# Patient Record
Sex: FEMALE | Race: ASIAN | NOT HISPANIC OR LATINO | Employment: FULL TIME | URBAN - METROPOLITAN AREA
[De-identification: names, ages, dates, MRNs, and addresses within clinical notes are randomized per-mention and may not be internally consistent; named-entity substitution may affect disease eponyms.]

---

## 2017-01-12 ENCOUNTER — TELEPHONE (OUTPATIENT)
Dept: FAMILY MEDICINE | Facility: CLINIC | Age: 52
End: 2017-01-12

## 2017-01-12 NOTE — TELEPHONE ENCOUNTER
Patient is due for a mammogram. Left message for patient to call back  [facility] [place] [facility] at [phone]. Encounter sent to reception team to send letter to home address.     If patient returns call, please help them schedule a mammogram.     Thank you,    [facility]

## 2017-01-12 NOTE — Clinical Note
January 13, 2017    Antonina Diana  [address]  [place] [address]    Dear Antonina Diana,     At [facility]  we care about your health and are committed to providing quality patient care, which includes staying current on preventative cancer screenings.  You can increase your chances of finding and treating cancers through regular screenings.      Our records show that you are due for the following screening:    Mammogram for breast cancer   Recommended every 1-2 years for women age 50 and older  Mammograms help detect breast cancer, which is the most common cancer among women in the [place].  You may need to start having mammograms earlier and more often if you have had breast cancer, breast problems, or a family history of breast cancer.     You may contact us by phone at [facility] at [phone] to schedule your mammogram at your earliest convenience.    If you have already had a mammogram at another facility, please call us so that we may update your chart.      Your partners in health,      Quality Committee   [facility]

## 2017-04-14 ENCOUNTER — TELEPHONE (OUTPATIENT)
Dept: FAMILY MEDICINE | Facility: CLINIC | Age: 52
End: 2017-04-14

## 2017-04-14 NOTE — LETTER
April 14, 2017      Antonina Diana  [address] [place] [address]            Dear Antonina,    In order to ensure we are providing the best quality care, we have reviewed your chart and see that you are due for:    -Physical with pap smear: Pap smear is a screening test used to detect cervical cancer. It is recommended every three years for women 21 and older. The test should be done at least once every three years but women who are at greater risk for cervical cancer may need to have the test more often.    -Mammogram: Recommended every 1-2 years for women age 50 and older  Mammograms help detect breast cancer, which is the most common cancer among women in the [place].  You may need to start having mammograms earlier and more often if you have had breast cancer, breast problems, or a family history of breast cancer.     -Colonoscopy/FIT test: Colonoscopy is recommended every ten years for everyone age 50 and older. Please take a moment to read over the enclosed information packet about colon cancer screening. We strongly urge our patient's to consider having a colonoscopy done, which is the best screening test available and only needs to be done every 10 years if normal. If you are unwilling or unable to have a colonoscopy then we recommend the annual stool testing for blood. This test is called a FIT test and it looks for blood in the stool.     Please call the clinic at your earliest convenience to schedule an appointment. If you have had any of the screenings listed above at another facility, please call us so that we may update your chart.      Thank you for trusting us with your health care.    Sincerely,    [facility]/ [phone]  [phone]

## 2017-04-14 NOTE — TELEPHONE ENCOUNTER
Panel Management Review    Patient is due/failing the following:   COLONOSCOPY, MAMMOGRAM, PAP and PHYSICAL    Action needed:   Patient needs office visit for Physical.    Type of outreach:    attempted to call with no anser and VM is full. letter was mailed to pt.    Questions for provider review:    None                                                                                                                                    GALE Silva

## 2017-04-27 NOTE — TELEPHONE ENCOUNTER
Patient returning call. Patient was informed that a letter was mailed out to her due to vm being full. Patient states she did not receive anything and would like a call back. Call patient to inform/advise. Thanks.    Patient called on 4/27/17    Edgardo Stroud, Patient Rep

## 2017-08-17 DIAGNOSIS — R21 SKIN RASH: ICD-10-CM

## 2017-08-17 NOTE — TELEPHONE ENCOUNTER
Reason for Call:  Medication or medication refill:    Do you use a [facility]?  Name of the pharmacy and phone number for the current request:  [facility] - [place] - [phone]    Name of the medication requested: hydrocortisone valerate (WEST-DONNA) 0.2 % ointment    Other request: Please call when sent to pharmacy     Can we leave a detailed message on this number? YES    Phone number patient can be reached at: Home number on file [phone] (home)    Best Time: Any    Call taken on 8/17/2017 at 6:57 PM by Kacie Faria

## 2017-08-18 NOTE — TELEPHONE ENCOUNTER
hydrocortisone valerate (WEST-DONNA) 0.2 % ointment      Last Written Prescription Date: 7/12/16  Last Fill Quantity: 45,  # refills: 1   Last Office Visit with [facility], [facility] or [facility] prescribing provider: 7/8/16      Calli Moya  [facility]

## 2017-08-22 RX ORDER — HYDROCORTISONE VALERATE 2 MG/G
OINTMENT TOPICAL
Qty: 45 G | Refills: 1 | Status: SHIPPED | OUTPATIENT
Start: 2017-08-22 | End: 2021-10-13

## 2017-10-29 ENCOUNTER — HEALTH MAINTENANCE LETTER (OUTPATIENT)
Age: 52
End: 2017-10-29

## 2017-12-16 ENCOUNTER — TELEPHONE (OUTPATIENT)
Dept: FAMILY MEDICINE | Facility: CLINIC | Age: 52
End: 2017-12-16

## 2017-12-16 NOTE — LETTER
Antonina Diana  [address]  [place] [place] [place] [address]          12/18/17      Dear Antonina Diana        At [facility] we care about your health and are committed to providing quality patient care. Regular appointments are a vital part of the care and management of your health and can help prevent many of the complications that can occur.      It has come to our attention that you are due for an office visit in order to process your medication request. Please call [facility] at [phone] soon to schedule your appointment.    If you have transferred care to another clinic please call to inform us so that we do not continue to send you reminder letters.      Sincerely,      [facility] Care Team

## 2017-12-18 NOTE — TELEPHONE ENCOUNTER
This writer attempted to contact Patient on 12/18/17      Reason for call Patient needs an office visit for medication request and unable to leave message.  Sent a letter.  If patient calls back:   Schedule Office Visit appointment within 1 week with primary care .        Nicolasa Scott MA

## 2017-12-18 NOTE — TELEPHONE ENCOUNTER
Patient has not been seen in over a year, also medication is not on medication list. Will need to be seen.    Mary Beth West RN, [facility]

## 2018-01-12 ENCOUNTER — OFFICE VISIT (OUTPATIENT)
Dept: FAMILY MEDICINE | Facility: CLINIC | Age: 53
End: 2018-01-12
Payer: COMMERCIAL

## 2018-01-12 VITALS
DIASTOLIC BLOOD PRESSURE: 85 MMHG | HEART RATE: 103 BPM | TEMPERATURE: 100.7 F | WEIGHT: 190 LBS | OXYGEN SATURATION: 95 % | RESPIRATION RATE: 16 BRPM | HEIGHT: 62 IN | SYSTOLIC BLOOD PRESSURE: 139 MMHG | BODY MASS INDEX: 34.96 KG/M2

## 2018-01-12 DIAGNOSIS — R68.89 FLU-LIKE SYMPTOMS: ICD-10-CM

## 2018-01-12 DIAGNOSIS — R11.10 VOMITING AND DIARRHEA: ICD-10-CM

## 2018-01-12 DIAGNOSIS — J20.9 ACUTE BRONCHITIS WITH SYMPTOMS > 10 DAYS: Primary | ICD-10-CM

## 2018-01-12 DIAGNOSIS — Z71.84 TRAVEL ADVICE ENCOUNTER: ICD-10-CM

## 2018-01-12 DIAGNOSIS — R19.7 VOMITING AND DIARRHEA: ICD-10-CM

## 2018-01-12 PROCEDURE — 99214 OFFICE O/P EST MOD 30 MIN: CPT | Performed by: FAMILY MEDICINE

## 2018-01-12 RX ORDER — CODEINE PHOSPHATE AND GUAIFENESIN 10; 100 MG/5ML; MG/5ML
2 SOLUTION ORAL EVERY 4 HOURS PRN
Qty: 236 ML | Refills: 1 | Status: SHIPPED | OUTPATIENT
Start: 2018-01-12 | End: 2018-01-20

## 2018-01-12 RX ORDER — ONDANSETRON 4 MG/1
4-8 TABLET, ORALLY DISINTEGRATING ORAL EVERY 8 HOURS PRN
Qty: 12 TABLET | Refills: 3 | Status: SHIPPED | OUTPATIENT
Start: 2018-01-12 | End: 2021-10-13

## 2018-01-12 RX ORDER — DOXYCYCLINE 100 MG/1
100 CAPSULE ORAL 2 TIMES DAILY
Qty: 20 CAPSULE | Refills: 0 | Status: SHIPPED | OUTPATIENT
Start: 2018-01-12 | End: 2018-01-22

## 2018-01-12 ASSESSMENT — PAIN SCALES - GENERAL: PAINLEVEL: NO PAIN (0)

## 2018-01-12 NOTE — PROGRESS NOTES
"  SUBJECTIVE:   Antonina Diana is a 52 year old female who presents to clinic today for the following health issues:    Acute Illness   Acute illness concerns: Cough  Onset: 1 week    Fever: YES    Chills/Sweats: YES    Headache (location?): YES    Sinus Pressure:YES    Conjunctivitis:  no    Ear Pain: YES: both    Rhinorrhea: No    Congestion: Yes     Sore Throat: YES     Cough: YES-productive of green and red sputum    Wheeze: YES    Decreased Appetite: no     Nausea: no     Vomiting: no     Diarrhea:  no     Dysuria/Freq.: no     Fatigue/Achiness: no     Sick/Strep Exposure: YES     Therapies Tried and outcome: OTC      Problem list and histories reviewed & adjusted, as indicated.  Additional history: as documented    Patient Active Problem List   Diagnosis     Hyperlipidemia LDL goal <160     Positional vertigo     Hyperglycemia     Past Surgical History:   Procedure Laterality Date     APPENDECTOMY  age 21       Social History   Substance Use Topics     Smoking status: Never Smoker     Smokeless tobacco: Never Used     Alcohol use No     Family History   Problem Relation Age of Onset     CANCER No family hx of      DIABETES No family hx of      Hypertension No family hx of      CEREBROVASCULAR DISEASE No family hx of      Thyroid Disease No family hx of      Glaucoma No family hx of      Macular Degeneration No family hx of              Reviewed and updated as needed this visit by clinical staff     Reviewed and updated as needed this visit by Provider         ROS:  Constitutional, HEENT, cardiovascular, pulmonary, GI, , musculoskeletal, neuro, skin, endocrine and psych systems are negative, except as otherwise noted.      OBJECTIVE:   /85 (BP Location: Left arm, Patient Position: Chair, Cuff Size: Adult Large)  Pulse 103  Temp 100.7  F (38.2  C) (Oral)  Resp 16  Ht 5' 2\" (1.575 m)  Wt 190 lb (86.2 kg)  SpO2 95%  BMI 34.75 kg/m2  Body mass index is 34.75 kg/(m^2).  GENERAL: healthy, alert and no " distress  NECK: no adenopathy, no asymmetry, masses, or scars and thyroid normal to palpation  RESP: lungs clear to auscultation - no rales, rhonchi or wheezes  CV: regular rate and rhythm, normal S1 S2, no S3 or S4, no murmur, click or rub, no peripheral edema and peripheral pulses strong  ABDOMEN: soft, nontender, no hepatosplenomegaly, no masses and bowel sounds normal  MS: no gross musculoskeletal defects noted, no edema    Diagnostic Test Results:  none     ASSESSMENT/PLAN:     1. Acute bronchitis with symptoms > 10 days  Symptoms worsening, continues to be febrile after 7 days, abx indicated. Doxycycline given for 10 days. RTC if no improvements.  - doxycycline monohydrate 100 MG capsule; Take 1 capsule (100 mg) by mouth 2 times daily for 10 days  Dispense: 20 capsule; Refill: 0  - guaiFENesin-codeine (ROBITUSSIN AC) 100-10 MG/5ML SOLN solution; Take 10 mLs by mouth every 4 hours as needed  Dispense: 236 mL; Refill: 1    2. Flu-like symptoms  Too late for Tamiflu. Discussed symptomatic cares.    3. Travel advice encounter  Planning to go to [place] in 1 month.  - TRAVEL CLINIC REFERRAL    See Patient Instructions    Dez Raya MD, MD  [facility]

## 2018-01-12 NOTE — MR AVS SNAPSHOT
"              After Visit Summary   1/12/2018    Antonina Diana    MRN: [id number]           Patient Information     Date Of Birth          1965        Visit Information        Provider Department      1/12/2018 8:20 AM Dez Raya MD [facility]        Today's Diagnoses     Acute bronchitis with symptoms > 10 days    -  1    Travel advice encounter           Follow-ups after your visit        Additional Services     TRAVEL CLINIC REFERRAL       Your provider has referred you to the [facility] - Please call [phone] to schedule an appointment.   Fax number: [phone]    Please be aware that coverage of these services is subject to the terms and limitations of your health insurance plans.  Call member services at your health plan with any benefit coverage questions.                  Who to contact     If you have questions or need follow up information about today's clinic visit or your schedule please contact [facility] directly at [phone].  Normal or non-critical lab and imaging results will be communicated to you by MyChart, letter or phone within 4 business days after the clinic has received the results. If you do not hear from us within 7 days, please contact the clinic through [employer] or phone. If you have a critical or abnormal lab result, we will notify you by phone as soon as possible.  Submit refill requests through [employer] or call your pharmacy and they will forward the refill request to us. Please allow 3 business days for your refill to be completed.          Additional Information About Your Visit        [employer] Information     [employer] lets you send messages to your doctor, view your test results, renew your prescriptions, schedule appointments and more. To sign up, go to [email or web address] . Click on \"Log in\" on the left side of the screen, which will take you to the Welcome page. Then click on \"Sign up Now\" on the right " "side of the page.     You will be asked to enter the access code listed below, as well as some personal information. Please follow the directions to create your username and password.     Your access code is: [email or web address]  Expires: 3/27/2018  3:20 PM     Your access code will  in 90 days. If you need help or a new code, please call your [facility] clinic or [phone].        Care EveryWhere ID     This is your Care EveryWhere ID. This could be used by other organizations to access your [facility] medical records  [id number]        Your Vitals Were     Pulse Temperature Respirations Height Pulse Oximetry BMI (Body Mass Index)    103 100.7  F (38.2  C) (Oral) 16 5' 2\" (1.575 m) 95% 34.75 kg/m2       Blood Pressure from Last 3 Encounters:   18 139/85   16 138/88   16 139/83    Weight from Last 3 Encounters:   18 190 lb (86.2 kg)   16 183 lb 9.6 oz (83.3 kg)   16 178 lb 6.4 oz (80.9 kg)              We Performed the Following     TRAVEL CLINIC REFERRAL          Today's Medication Changes          These changes are accurate as of: 18  8:33 AM.  If you have any questions, ask your nurse or doctor.               Start taking these medicines.        Dose/Directions    doxycycline monohydrate 100 MG capsule   Used for:  Acute bronchitis with symptoms > 10 days   Started by:  Dez Raya MD        Dose:  100 mg   Take 1 capsule (100 mg) by mouth 2 times daily for 10 days   Quantity:  20 capsule   Refills:  0       guaiFENesin-codeine 100-10 MG/5ML Soln solution   Commonly known as:  ROBITUSSIN AC   Used for:  Acute bronchitis with symptoms > 10 days   Started by:  Dez Raya MD        Dose:  2 tsp.   Take 10 mLs by mouth every 4 hours as needed   Quantity:  236 mL   Refills:  1            Where to get your medicines      These medications were sent to [facility] [place] - [address]  [address], [place] [address]     Phone:  " [phone]     doxycycline monohydrate 100 MG capsule         Some of these will need a paper prescription and others can be bought over the counter.  Ask your nurse if you have questions.     Bring a paper prescription for each of these medications     guaiFENesin-codeine 100-10 MG/5ML Soln solution                Primary Care Provider Office Phone # Fax #    Shirin Rei Diana [phone] [address] [place] [facility] [address]        Equal Access to Services     [facility] : Hadii aad ku hadasho Soomaali, waaxda luqadaha, qaybta kaalmada adeegyada, waxay idiin hayaan adeeg kharavita lajonelle hogan. So [facility] [phone].    ATENCIÓN: Si habla español, tiene a corona disposición servicios gratuitos de asistencia lingüística. [facility] [phone].    We comply with applicable federal civil rights laws and [place] laws. We do not discriminate on the basis of race, color, national origin, age, disability, sex, sexual orientation, or gender identity.            Thank you!     Thank you for choosing [facility]  for your care. Our goal is always to provide you with excellent care. Hearing back from our patients is one way we can continue to improve our services. Please take a few minutes to complete the written survey that you may receive in the mail after your visit with us. Thank you!             Your Updated Medication List - Protect others around you: Learn how to safely use, store and throw away your medicines at www.disposemymeds.org.          This list is accurate as of: 1/12/18  8:33 AM.  Always use your most recent med list.                   Brand Name Dispense Instructions for use Diagnosis    acetaminophen 325 MG tablet    TYLENOL    100 tablet    Take 2 tablets (650 mg) by mouth every 6 hours as needed for mild pain    Headache(784.0)       doxycycline monohydrate 100 MG capsule     20 capsule    Take 1 capsule (100 mg) by mouth 2 times daily for 10 days    Acute bronchitis  with symptoms > 10 days       guaiFENesin-codeine 100-10 MG/5ML Soln solution    ROBITUSSIN AC    236 mL    Take 10 mLs by mouth every 4 hours as needed    Acute bronchitis with symptoms > 10 days       hydrocortisone valerate 0.2 % ointment    WEST-DONNA    45 g    Apply sparingly to affected area three times daily for 14 days.    Skin rash       ondansetron 4 MG ODT tab    ZOFRAN ODT    12 tablet    Take 1-2 tablets (4-8 mg) by mouth every 8 hours as needed for nausea or vomiting    Vomiting and diarrhea

## 2018-01-12 NOTE — TELEPHONE ENCOUNTER
Routing refill request to provider for review/approval because:  A break in medication last filled 7/8/16.    Mary Beth West RN, [facility]

## 2018-01-12 NOTE — TELEPHONE ENCOUNTER
.Reason for Call:  Medication or medication refill:    Do you use a [facility]?  Name of the pharmacy and phone number for the current request:  [facility] - [place] - [phone]    Name of the medication requested: ondansetron (ZOFRAN ODT) 4 MG disintegrating tablet    Other request: Patient stated that she would like to get the Zofran refill and would like to be notified when it is sent to the pharmacy.    Can we leave a detailed message on this number? YES    Phone number patient can be reached at: Home number on file [phone] (home)    Best Time: any    Call taken on 1/12/2018 at 9:00 AM by Gallo Humphries

## 2018-01-20 DIAGNOSIS — J20.9 ACUTE BRONCHITIS WITH SYMPTOMS > 10 DAYS: ICD-10-CM

## 2018-01-20 NOTE — TELEPHONE ENCOUNTER
guaiFENesin-codeine (ROBITUSSIN AC) 100-10 MG/5ML SOLN solution filled 1/12/18.          Lalito Faarax  [facility]

## 2018-01-23 RX ORDER — CODEINE PHOSPHATE AND GUAIFENESIN 10; 100 MG/5ML; MG/5ML
2 SOLUTION ORAL EVERY 4 HOURS PRN
Qty: 236 ML | Refills: 1 | Status: SHIPPED | OUTPATIENT
Start: 2018-01-23 | End: 2021-10-13

## 2018-01-23 NOTE — TELEPHONE ENCOUNTER
Routing refill request to provider for review/approval because:  Drug not on the [facility] refill protocol     Yanni Roque RN   [facility]

## 2021-10-10 ENCOUNTER — NURSE TRIAGE (OUTPATIENT)
Dept: NURSING | Facility: CLINIC | Age: 56
End: 2021-10-10

## 2021-10-11 NOTE — TELEPHONE ENCOUNTER
Patient calling reporting having frequent urination. Denies having decreased urine out put. Denies fever. Taking good intake. Advised patient to be seen within 24 hours. Caller verbalized understanding. Denies further questions.      Lucas Lawrence RN  [facility] Nurse Advisors     COVID 19 Nurse Triage Plan/Patient Instructions    Please be aware that novel coronavirus (COVID-19) may be circulating in the community. If you develop symptoms such as fever, cough, or SOB or if you have concerns about the presence of another infection including coronavirus (COVID-19), please contact your health care provider or visit [email or web address].     Disposition/Instructions    In-Person Visit with provider recommended. Reference Visit Selection Guide.    Thank you for taking steps to prevent the spread of this virus.  o Limit your contact with others.  o Wear a simple mask to cover your cough.  o Wash your hands well and often.    Resources    [facility]: About COVID-19: [email or web address]    CDC: What to Do If You're Sick: www.cdc.gov/coronavirus/2019-ncov/about/steps-when-sick.html    CDC: Ending Home Isolation: www.cdc.gov/coronavirus/2019-ncov/hcp/disposition-in-home-patients.html     CDC: Caring for Someone: www.cdc.gov/coronavirus/2019-ncov/if-you-are-sick/care-for-someone.html     [facility]: Interim Guidance for Hospital Discharge to Home: www.health.[facility].[place]/diseases/coronavirus/hcp/hospdischarge.pdf    [facility] clinical trials (COVID-19 research studies): [email or web address].[facility]-clinical-trials     Below are the COVID-19 hotlines at the [place] Department of Health ([facility]). Interpreters are available.   o For health questions: Call 411-452-8458 or 1-259.440.4263 (7 a.m. to 7 p.m.)  o For questions about schools and childcare: Call 090-534-1602 or 1-990.513.4948 (7 a.m. to 7 p.m.)                       Reason for Disposition    [1] Can't control passage of urine (i.e.,  urinary incontinence) AND [2] new onset (< 2 weeks) or worsening    Additional Information    Negative: Shock suspected (e.g., cold/pale/clammy skin, too weak to stand, low BP, rapid pulse)    Negative: Sounds like a life-threatening emergency to the triager    Negative: [1] Unable to urinate (or only a few drops) > 4 hours AND     [2] bladder feels very full (e.g., palpable bladder or strong urge to urinate)    Negative: [1] Decreased urination and [2] drinking very little AND [2] dehydration suspected (e.g., dark urine, no urine > 12 hours, very dry mouth, very lightheaded)    Negative: Patient sounds very sick or weak to the triager    Negative: Fever > 100.4 F (38.0 C)    Negative: Side (flank) or lower back pain present    Protocols used: URINARY SYMPTOMS-A-AH

## 2021-10-12 NOTE — PATIENT INSTRUCTIONS
Preventive Health Recommendations  Female Ages 50 - 64    Yearly exam: See your health care provider every year in order to  o Review health changes.   o Discuss preventive care.    o Review your medicines if your doctor has prescribed any.      Get a Pap test every three years (unless you have an abnormal result and your provider advises testing more often).    If you get Pap tests with HPV test, you only need to test every 5 years, unless you have an abnormal result.     You do not need a Pap test if your uterus was removed (hysterectomy) and you have not had cancer.    You should be tested each year for STDs (sexually transmitted diseases) if you're at risk.     Have a mammogram every 1 to 2 years.    Have a colonoscopy at age 50, or have a yearly FIT test (stool test). These exams screen for colon cancer.      Have a cholesterol test every 5 years, or more often if advised.    Have a diabetes test (fasting glucose) every three years. If you are at risk for diabetes, you should have this test more often.     If you are at risk for osteoporosis (brittle bone disease), think about having a bone density scan (DEXA).    Shots: Get a flu shot each year. Get a tetanus shot every 10 years.    Nutrition:     Eat at least 5 servings of fruits and vegetables each day.    Eat whole-grain bread, whole-wheat pasta and brown rice instead of white grains and rice.    Get adequate Calcium and Vitamin D.     Lifestyle    Exercise at least 150 minutes a week (30 minutes a day, 5 days a week). This will help you control your weight and prevent disease.    Limit alcohol to one drink per day.    No smoking.     Wear sunscreen to prevent skin cancer.     See your dentist every six months for an exam and cleaning.    See your eye doctor every 1 to 2 years.  At [facility], we strive to deliver an exceptional experience to you, every time we see you. If you receive a survey, please complete it as we do  value your feedback.  If you have MyChart, you can expect to receive results automatically within 24 hours of their completion.  Your provider will send a note interpreting your results as well.   If you do not have MyChart, you should receive your results in about a week by mail.    Your care team:                            Family Medicine Internal Medicine   MD oTm Gordillo MD Shantel Branch-Fleming, MD Mamadou Dangelo, MD Sanna Calix, PAANNETTE Pereira, APRAURA Raya, MD Pediatrics   Cm Sommer, MILLICENT Jauregui, MD Rebeca Kilgore APRN CNP   MD Sury Lieberman MD Deborah Mielke, MD Kim Thein, APR[facility]      Clinic hours: Monday - Thursday 7 am-6 pm; Fridays 7 am-5 pm.   Urgent care: Monday - Friday 10 am- 8 pm; Saturday and Sunday 9 am-5 pm.    Clinic: [phone]       [facility]: Monday - Thursday 8 am - 7 pm; Friday 8 am - 6 pm  [facility]: [phone]     Use www.[email or web address].org for 24/7 diagnosis and treatment of dozens of conditions.

## 2021-10-12 NOTE — PROGRESS NOTES
Answers for HPI/ROS submitted by the patient on 10/13/2021  If you checked off any problems, how difficult have these problems made it for you to do your work, take care of things at home, or get along with other people?: Not difficult at all  PHQ9 TOTAL SCORE: 3         SUBJECTIVE:   CC: Antonina Diana is an 56 year old woman who presents for preventive health visit.       Patient has been advised of split billing requirements and indicates understanding: Yes  Healthy Habits:     Getting at least 3 servings of Calcium per day:  NO    Bi-annual eye exam:  Yes    Dental care twice a year:  Yes    Sleep apnea or symptoms of sleep apnea:  None    Diet:  Regular (no restrictions)    Frequency of exercise:  1 day/week    Duration of exercise:  Other    PHQ-2 Total Score: 3    Additional concerns today:  No      Today's PHQ-2 Score:   PHQ-2 ( 1999 Pfizer) 1/12/2018   Q1: Little interest or pleasure in doing things 0   Q2: Feeling down, depressed or hopeless 0   PHQ-2 Score 0       Abuse: Current or Past (Physical, Sexual or Emotional) - No  Do you feel safe in your environment? Yes    Have you ever done Advance Care Planning? (For example, a Health Directive, POLST, or a discussion with a medical provider or your loved ones about your wishes): No, advance care planning information given to patient to review.  Patient plans to discuss their wishes with loved ones or provider.      Social History     Tobacco Use     Smoking status: Never Smoker     Smokeless tobacco: Never Used   Substance Use Topics     Alcohol use: No     If you drink alcohol do you typically have >3 drinks per day or >7 drinks per week? No    Alcohol Use 3/12/2013   Prescreen: >3 drinks/day or >7 drinks/week? The patient does not drink >3 drinks per day nor >7 drinks per week.   No flowsheet data found.      History of abnormal Pap smear: NO - age 30-65 PAP every 5 years with negative HPV co-testing recommended     Reviewed and updated as needed this visit  "by clinical staff                 Reviewed and updated as needed this visit by Provider                    Review of Systems  CONSTITUTIONAL: NEGATIVE for fever, chills, change in weight  INTEGUMENTARY/SKIN: NEGATIVE for worrisome rashes, moles or lesions  EYES: NEGATIVE for vision changes or irritation  ENT: NEGATIVE for ear, mouth and throat problems  RESP: NEGATIVE for significant cough or SOB  BREAST: NEGATIVE for masses, tenderness or discharge  CV: NEGATIVE for chest pain, palpitations or peripheral edema  GI: NEGATIVE for nausea, abdominal pain, heartburn, or change in bowel habits  : NEGATIVE for unusual urinary or vaginal symptoms. No vaginal bleeding.  MUSCULOSKELETAL: NEGATIVE for significant arthralgias or myalgia  NEURO: NEGATIVE for weakness, dizziness or paresthesias  PSYCHIATRIC: NEGATIVE for changes in mood or affect      OBJECTIVE:   /87 (BP Location: Left arm, Patient Position: Sitting, Cuff Size: Adult Regular)   Pulse 74   Temp 97.5  F (36.4  C) (Tympanic)   Resp 16   Ht 1.57 m (5' 1.81\")   Wt 67.6 kg (149 lb)   SpO2 96%   BMI 27.42 kg/m    Physical Exam  GENERAL: healthy, alert and no distress  NECK: no adenopathy, no asymmetry, masses, or scars and thyroid normal to palpation  RESP: lungs clear to auscultation - no rales, rhonchi or wheezes  CV: regular rate and rhythm, normal S1 S2, no S3 or S4, no murmur, click or rub, no peripheral edema and peripheral pulses strong  ABDOMEN: soft, nontender, no hepatosplenomegaly, no masses and bowel sounds normal  MS: no gross musculoskeletal defects noted, no edema    Diagnostic Test Results:  Labs reviewed in Epic    ASSESSMENT/PLAN:   (Z00.00) Routine general medical examination at a health care facility  (primary encounter diagnosis)  Comment:   Plan: as below.    (E78.5) Hyperlipidemia LDL goal <160  Comment:   Plan: Lipid panel reflex to direct LDL Fasting        Recheck.    (Z13.1) Screening for diabetes mellitus  Comment:   Plan: " "Hemoglobin A1c            (Z12.11) Screen for colon cancer  Comment:   Plan: Fecal colorectal cancer screen (FIT)            (Z12.31) Encounter for screening mammogram for breast cancer  Comment:   Plan: MA SCREENING DIGITAL BILAT - Future  (s+30)            (Z12.4) Screening for malignant neoplasm of cervix  Comment:   Plan: Pap Screen with HPV - recommended age 30 - 65         years, Ob/Gyn Referral. Unable to do Pap today. Patient could not relax and painful. Referred to Gynecology to try to attempt.            (Z11.4) Screening for HIV (human immunodeficiency virus)  Comment:   Plan: HIV Antigen Antibody Combo            (Z11.59) Need for hepatitis C screening test  Comment:   Plan: Hepatitis C Screen Reflex to HCV RNA Quant and         Genotype            (R35.0) Urinary frequency  Comment:   Plan: UA with Microscopic reflex to Culture - lab         collect        R/o UTI, diabetes    (Z23) Encounter for immunization  Comment:   Plan: INFLUENZA QUAD, RECOMBINANT, P-FREE (RIV4)         (FLUBLOK)              Patient has been advised of split billing requirements and indicates understanding: Yes  COUNSELING:  Reviewed preventive health counseling, as reflected in patient instructions       Regular exercise       Healthy diet/nutrition       Vision screening    Estimated body mass index is 34.75 kg/m  as calculated from the following:    Height as of 1/12/18: 1.575 m (5' 2\").    Weight as of 1/12/18: 86.2 kg (190 lb).        She reports that she has never smoked. She has never used smokeless tobacco.      Counseling Resources:  ATP IV Guidelines  Pooled Cohorts Equation Calculator  Breast Cancer Risk Calculator  BRCA-Related Cancer Risk Assessment: FHS-7 Tool  FRAX Risk Assessment  ICSI Preventive Guidelines  Dietary Guidelines for Americans, 2010  USDA's MyPlate  ASA Prophylaxis  Lung CA Screening    Dez Raya MD, MD  [facility]  "

## 2021-10-13 ENCOUNTER — TELEPHONE (OUTPATIENT)
Dept: FAMILY MEDICINE | Facility: CLINIC | Age: 56
End: 2021-10-13

## 2021-10-13 ENCOUNTER — OFFICE VISIT (OUTPATIENT)
Dept: FAMILY MEDICINE | Facility: CLINIC | Age: 56
End: 2021-10-13
Payer: COMMERCIAL

## 2021-10-13 ENCOUNTER — NURSE TRIAGE (OUTPATIENT)
Dept: NURSING | Facility: CLINIC | Age: 56
End: 2021-10-13

## 2021-10-13 VITALS
DIASTOLIC BLOOD PRESSURE: 87 MMHG | SYSTOLIC BLOOD PRESSURE: 135 MMHG | BODY MASS INDEX: 27.42 KG/M2 | RESPIRATION RATE: 16 BRPM | OXYGEN SATURATION: 96 % | TEMPERATURE: 97.5 F | WEIGHT: 149 LBS | HEIGHT: 62 IN | HEART RATE: 74 BPM

## 2021-10-13 DIAGNOSIS — Z13.1 SCREENING FOR DIABETES MELLITUS: ICD-10-CM

## 2021-10-13 DIAGNOSIS — Z11.59 NEED FOR HEPATITIS C SCREENING TEST: ICD-10-CM

## 2021-10-13 DIAGNOSIS — Z00.00 ROUTINE GENERAL MEDICAL EXAMINATION AT A HEALTH CARE FACILITY: Primary | ICD-10-CM

## 2021-10-13 DIAGNOSIS — Z23 ENCOUNTER FOR IMMUNIZATION: ICD-10-CM

## 2021-10-13 DIAGNOSIS — Z12.31 ENCOUNTER FOR SCREENING MAMMOGRAM FOR BREAST CANCER: ICD-10-CM

## 2021-10-13 DIAGNOSIS — Z12.11 SCREEN FOR COLON CANCER: ICD-10-CM

## 2021-10-13 DIAGNOSIS — E78.5 HYPERLIPIDEMIA LDL GOAL <160: ICD-10-CM

## 2021-10-13 DIAGNOSIS — Z12.4 SCREENING FOR MALIGNANT NEOPLASM OF CERVIX: ICD-10-CM

## 2021-10-13 DIAGNOSIS — Z11.4 SCREENING FOR HIV (HUMAN IMMUNODEFICIENCY VIRUS): ICD-10-CM

## 2021-10-13 DIAGNOSIS — R35.0 URINARY FREQUENCY: ICD-10-CM

## 2021-10-13 LAB
ALBUMIN SERPL-MCNC: 3.7 G/DL (ref 3.4–5)
ALBUMIN UR-MCNC: NEGATIVE MG/DL
ALP SERPL-CCNC: 76 U/L (ref 40–150)
ALT SERPL W P-5'-P-CCNC: 24 U/L (ref 0–50)
ANION GAP SERPL CALCULATED.3IONS-SCNC: 7 MMOL/L (ref 3–14)
APPEARANCE UR: CLEAR
AST SERPL W P-5'-P-CCNC: 13 U/L (ref 0–45)
BILIRUB SERPL-MCNC: 0.3 MG/DL (ref 0.2–1.3)
BILIRUB UR QL STRIP: NEGATIVE
BUN SERPL-MCNC: 12 MG/DL (ref 7–30)
CALCIUM SERPL-MCNC: 9.5 MG/DL (ref 8.5–10.1)
CHLORIDE BLD-SCNC: 99 MMOL/L (ref 94–109)
CHOLEST SERPL-MCNC: 224 MG/DL
CO2 SERPL-SCNC: 28 MMOL/L (ref 20–32)
COLOR UR AUTO: YELLOW
CREAT SERPL-MCNC: 0.49 MG/DL (ref 0.52–1.04)
FASTING STATUS PATIENT QL REPORTED: YES
GFR SERPL CREATININE-BSD FRML MDRD: >90 ML/MIN/1.73M2
GLUCOSE BLD-MCNC: 416 MG/DL (ref 70–99)
GLUCOSE UR STRIP-MCNC: >=1000 MG/DL
HBA1C MFR BLD: >15 % (ref 0–5.6)
HCV AB SERPL QL IA: NONREACTIVE
HDLC SERPL-MCNC: 51 MG/DL
HEMOCCULT STL QL IA: NEGATIVE
HGB UR QL STRIP: ABNORMAL
HIV 1+2 AB+HIV1 P24 AG SERPL QL IA: NONREACTIVE
KETONES UR STRIP-MCNC: NEGATIVE MG/DL
LDLC SERPL CALC-MCNC: 148 MG/DL
LEUKOCYTE ESTERASE UR QL STRIP: NEGATIVE
NITRATE UR QL: NEGATIVE
NONHDLC SERPL-MCNC: 173 MG/DL
PH UR STRIP: 6 [PH] (ref 5–7)
POTASSIUM BLD-SCNC: 4.3 MMOL/L (ref 3.4–5.3)
PROT SERPL-MCNC: 7.6 G/DL (ref 6.8–8.8)
RBC #/AREA URNS AUTO: ABNORMAL /HPF
SODIUM SERPL-SCNC: 134 MMOL/L (ref 133–144)
SP GR UR STRIP: 1.02 (ref 1–1.03)
SQUAMOUS #/AREA URNS AUTO: ABNORMAL /LPF
TRIGL SERPL-MCNC: 127 MG/DL
UROBILINOGEN UR STRIP-ACNC: 0.2 E.U./DL
WBC #/AREA URNS AUTO: ABNORMAL /HPF

## 2021-10-13 PROCEDURE — 86803 HEPATITIS C AB TEST: CPT | Performed by: FAMILY MEDICINE

## 2021-10-13 PROCEDURE — 82274 ASSAY TEST FOR BLOOD FECAL: CPT | Performed by: FAMILY MEDICINE

## 2021-10-13 PROCEDURE — 99396 PREV VISIT EST AGE 40-64: CPT | Mod: 25 | Performed by: FAMILY MEDICINE

## 2021-10-13 PROCEDURE — 81001 URINALYSIS AUTO W/SCOPE: CPT | Performed by: FAMILY MEDICINE

## 2021-10-13 PROCEDURE — 90471 IMMUNIZATION ADMIN: CPT | Performed by: FAMILY MEDICINE

## 2021-10-13 PROCEDURE — 80053 COMPREHEN METABOLIC PANEL: CPT | Performed by: FAMILY MEDICINE

## 2021-10-13 PROCEDURE — 83036 HEMOGLOBIN GLYCOSYLATED A1C: CPT | Performed by: FAMILY MEDICINE

## 2021-10-13 PROCEDURE — 80061 LIPID PANEL: CPT | Performed by: FAMILY MEDICINE

## 2021-10-13 PROCEDURE — 87389 HIV-1 AG W/HIV-1&-2 AB AG IA: CPT | Performed by: FAMILY MEDICINE

## 2021-10-13 PROCEDURE — 36415 COLL VENOUS BLD VENIPUNCTURE: CPT | Performed by: FAMILY MEDICINE

## 2021-10-13 PROCEDURE — 90682 RIV4 VACC RECOMBINANT DNA IM: CPT | Performed by: FAMILY MEDICINE

## 2021-10-13 ASSESSMENT — ENCOUNTER SYMPTOMS
SHORTNESS OF BREATH: 0
FEVER: 0
ABDOMINAL PAIN: 0
EYE PAIN: 0
CHILLS: 0
HEARTBURN: 0
HEMATURIA: 0
DYSURIA: 0
FREQUENCY: 0
SORE THROAT: 0
NERVOUS/ANXIOUS: 0
HEMATOCHEZIA: 0
HEADACHES: 0
NAUSEA: 0
DIARRHEA: 0
ARTHRALGIAS: 0
PARESTHESIAS: 0
CONSTIPATION: 0
COUGH: 0
WEAKNESS: 0
JOINT SWELLING: 0
DIZZINESS: 0
MYALGIAS: 0
PALPITATIONS: 0

## 2021-10-13 ASSESSMENT — MIFFLIN-ST. JEOR: SCORE: 1221.11

## 2021-10-13 ASSESSMENT — PATIENT HEALTH QUESTIONNAIRE - PHQ9
SUM OF ALL RESPONSES TO PHQ QUESTIONS 1-9: 3
10. IF YOU CHECKED OFF ANY PROBLEMS, HOW DIFFICULT HAVE THESE PROBLEMS MADE IT FOR YOU TO DO YOUR WORK, TAKE CARE OF THINGS AT HOME, OR GET ALONG WITH OTHER PEOPLE: NOT DIFFICULT AT ALL
SUM OF ALL RESPONSES TO PHQ QUESTIONS 1-9: 3

## 2021-10-13 NOTE — TELEPHONE ENCOUNTER
TELEPHONE CALL -    Reason for call returning a missed called -    Let her know that they were trying to contact her to his High Blood sugar  Asked the pt if she was ever beed diagnosed as Diabetics and she does not know. - She has frequency with urination and that is the reasons she was at her PCP today.  No labs in the las 5 years.    She asked questions and her urinating symptoms  RN directed to his PCP and advised her to make an appt to talked to PCP about test result as soon as possible     Taylor Davis, RN has Informed patient PCP/Provider s about this labs    No symptoms reported, no reason to TRIAGE patient for this nurse.  Patient s questions answered.  Reminded we will be here 24/7 for any other questions or concerns.  Pt was transferred to scheduling    Reminded we will be here 24/7 and can call back if symptoms worsen   Tanya Boone RN Nurse Triage Advisor 6:25 PM 10/13/2021

## 2021-10-13 NOTE — TELEPHONE ENCOUNTER
RN received call from [facility] regarding critical lab value.     Glucose 70 - 99 mg/dL 416   High Panic       Patient was in clinic today for annual physical, labs done today.    Also noting abnormally high A1c result (>15.0)  No diagnosis of diabetes in chart or problem list.  No symptoms of hyperglycemia noted in office notes today.    Has not had any labs done with [facility] in over 5 years so unable to reference any recent blood test for comparison.    Routing to PCP to review and advise.      Taylor Davis RN  [facility]

## 2021-10-13 NOTE — NURSING NOTE
Prior to immunization administration, verified patients identity using patient s name and date of birth. Please see Immunization Activity for additional information.     Screening Questionnaire for Adult Immunization    Are you sick today?   No   Do you have allergies to medications, food, a vaccine component or latex?   No   Have you ever had a serious reaction after receiving a vaccination?   No   Do you have a long-term health problem with heart, lung, kidney, or metabolic disease (e.g., diabetes), asthma, a blood disorder, no spleen, complement component deficiency, a cochlear implant, or a spinal fluid leak?  Are you on long-term aspirin therapy?   No   Do you have cancer, leukemia, HIV/AIDS, or any other immune system problem?   No   Do you have a parent, brother, or sister with an immune system problem?   No   In the past 3 months, have you taken medications that affect  your immune system, such as prednisone, other steroids, or anticancer drugs; drugs for the treatment of rheumatoid arthritis, Crohn s disease, or psoriasis; or have you had radiation treatments?   No   Have you had a seizure, or a brain or other nervous system problem?   No   During the past year, have you received a transfusion of blood or blood    products, or been given immune (gamma) globulin or antiviral drug?   No   For women: Are you pregnant or is there a chance you could become       pregnant during the next month?   No   Have you received any vaccinations in the past 4 weeks?   No     Immunization questionnaire answers were all negative.        Per orders of Dr. SHARPE, injection of flu  given by Lian Ryan. Patient instructed to remain in clinic for 15 minutes afterwards, and to report any adverse reaction to me immediately.       Screening performed by Lian Ryan on 10/13/2021 at 10:01 AM.

## 2021-10-14 ENCOUNTER — VIRTUAL VISIT (OUTPATIENT)
Dept: FAMILY MEDICINE | Facility: CLINIC | Age: 56
End: 2021-10-14
Payer: COMMERCIAL

## 2021-10-14 DIAGNOSIS — E11.65 TYPE 2 DIABETES MELLITUS WITH HYPERGLYCEMIA, WITHOUT LONG-TERM CURRENT USE OF INSULIN (H): Primary | ICD-10-CM

## 2021-10-14 DIAGNOSIS — E78.5 HYPERLIPIDEMIA LDL GOAL <100: ICD-10-CM

## 2021-10-14 PROCEDURE — 99214 OFFICE O/P EST MOD 30 MIN: CPT | Mod: 95 | Performed by: FAMILY MEDICINE

## 2021-10-14 RX ORDER — ATORVASTATIN CALCIUM 20 MG/1
20 TABLET, FILM COATED ORAL DAILY
Qty: 90 TABLET | Refills: 3 | Status: SHIPPED | OUTPATIENT
Start: 2021-10-14 | End: 2022-06-03

## 2021-10-14 RX ORDER — METFORMIN HCL 500 MG
TABLET, EXTENDED RELEASE 24 HR ORAL
Qty: 360 TABLET | Refills: 1 | Status: SHIPPED | OUTPATIENT
Start: 2021-10-14 | End: 2022-06-03

## 2021-10-14 ASSESSMENT — PATIENT HEALTH QUESTIONNAIRE - PHQ9: SUM OF ALL RESPONSES TO PHQ QUESTIONS 1-9: 3

## 2021-10-14 NOTE — PROGRESS NOTES
Antonina is a 56 year old who is being evaluated via a billable telephone visit.      What phone number would you like to be contacted at? mobile  How would you like to obtain your AVS? Danielle      Bhakti Sarkar is a 56 year old who presents for the following health issues:    HPI     Diabetes Follow-up      How often are you checking your blood sugar? Not at all, newly diagnosed    What concerns do you have today about your diabetes? None     Do you have any of these symptoms? (Select all that apply)  No numbness or tingling in feet.  No redness, sores or blisters on feet.  No complaints of excessive thirst.  No reports of blurry vision.  No significant changes to weight.    Have you had a diabetic eye exam in the last 12 months? No        BP Readings from Last 2 Encounters:   10/13/21 135/87   01/12/18 139/85     Hemoglobin A1C (%)   Date Value   10/13/2021 >15.0 (H)     LDL Cholesterol Calculated (mg/dL)   Date Value   10/13/2021 148 (H)   03/12/2013 167 (H)   03/23/2011 158         Hyperlipidemia Follow-Up      Are you regularly taking any medication or supplement to lower your cholesterol?   No    Are you having muscle aches or other side effects that you think could be caused by your cholesterol lowering medication? n/a      Review of Systems   Constitutional, HEENT, cardiovascular, pulmonary, GI, , musculoskeletal, neuro, skin, endocrine and psych systems are negative, except as otherwise noted.      Objective           Vitals:  No vitals were obtained today due to virtual visit.    Physical Exam   healthy, alert and no distress  PSYCH: Alert and oriented times 3; coherent speech, normal   rate and volume, able to articulate logical thoughts, able   to abstract reason, no tangential thoughts, no hallucinations   or delusions  Her affect is normal  RESP: No cough, no audible wheezing, able to talk in full sentences  Remainder of exam unable to be completed due to telephone visits    A/P:  (E11.65) Type 2  diabetes mellitus with hyperglycemia, without long-term current use of insulin (H)  (primary encounter diagnosis)  Comment: newly diagnosed  Plan: metFORMIN (GLUCOPHAGE-XR) 500 MG 24 hr tablet        a1c over 15. Discussed insulin but patient did not want this. Will start metformin. Recheck in 2 months.    (E78.5) Hyperlipidemia LDL goal <100  Comment:   Plan: atorvastatin (LIPITOR) 20 MG tablet        Start atorvastatin. Recheck in 2 months.    Dez Raya MD          Phone call duration: 11 minutes

## 2021-11-26 ENCOUNTER — IMMUNIZATION (OUTPATIENT)
Dept: NURSING | Facility: CLINIC | Age: 56
End: 2021-11-26
Payer: COMMERCIAL

## 2021-11-26 PROCEDURE — 0064A COVID-19,PF,MODERNA (18+ YRS BOOSTER .25ML): CPT

## 2021-11-26 PROCEDURE — 91306 COVID-19,PF,MODERNA (18+ YRS BOOSTER .25ML): CPT

## 2021-12-06 ENCOUNTER — TELEPHONE (OUTPATIENT)
Dept: FAMILY MEDICINE | Facility: CLINIC | Age: 56
End: 2021-12-06
Payer: COMMERCIAL

## 2021-12-06 DIAGNOSIS — E78.5 HYPERLIPIDEMIA LDL GOAL <100: Primary | ICD-10-CM

## 2021-12-06 NOTE — TELEPHONE ENCOUNTER
"OTC med not on current med list.  Left message on answering machine for patient to call back.   Needing to verify what he is currently taking for \"Fish Oil\";  ie mgs, qty.    Requested he call back his clinic RN team at [phone].  Amada Sandhu RN    "

## 2021-12-06 NOTE — TELEPHONE ENCOUNTER
Reason for call:  Medication   If this is a refill request, has the caller requested the refill from the pharmacy already? No  Will the patient be using a [facility]? No  Name of the pharmacy and phone number for the current request: [facility]     Name of the medication requested: FISH OIL     Other request: CALL PT WHEN SENT TO PHARMACY     Phone number to reach patient:  Cell number on file:    Telephone Information:   Mobile [phone]       Best Time:  ANY    Can we leave a detailed message on this number?  PT WILL CALL BACK IF SHE MISSES CALL     Travel screening: Not Applicable

## 2021-12-06 NOTE — TELEPHONE ENCOUNTER
Patient called back.  She needs a prescription for the Fish Oil, she is unsure of the mgs or quantity.  Did not see in recent visit notes.    Routing to Dr. Dez Raya to please review when able.  Informed the patient that Dr. Raya is not in clinic today but will be back tomorrow.  She is ok with waiting for the call back.    Ok to leave detailed message if she does not answer.    Juanita Parikh RN, [facility]

## 2021-12-07 RX ORDER — METAPROTERENOL SULFATE 10 MG
2000 TABLET ORAL DAILY
Qty: 360 CAPSULE | Refills: 3 | Status: SHIPPED | OUTPATIENT
Start: 2021-12-07

## 2022-01-05 ENCOUNTER — OFFICE VISIT (OUTPATIENT)
Dept: URGENT CARE | Facility: URGENT CARE | Age: 57
End: 2022-01-05
Payer: COMMERCIAL

## 2022-01-05 VITALS
TEMPERATURE: 97.7 F | HEART RATE: 75 BPM | WEIGHT: 145.9 LBS | OXYGEN SATURATION: 96 % | BODY MASS INDEX: 26.85 KG/M2 | DIASTOLIC BLOOD PRESSURE: 74 MMHG | SYSTOLIC BLOOD PRESSURE: 119 MMHG

## 2022-01-05 DIAGNOSIS — Z20.822 CLOSE EXPOSURE TO COVID-19 VIRUS: Primary | ICD-10-CM

## 2022-01-05 DIAGNOSIS — E11.65 TYPE 2 DIABETES MELLITUS WITH HYPERGLYCEMIA, WITHOUT LONG-TERM CURRENT USE OF INSULIN (H): ICD-10-CM

## 2022-01-05 PROCEDURE — 99212 OFFICE O/P EST SF 10 MIN: CPT | Performed by: FAMILY MEDICINE

## 2022-01-05 PROCEDURE — U0005 INFEC AGEN DETEC AMPLI PROBE: HCPCS | Performed by: FAMILY MEDICINE

## 2022-01-05 PROCEDURE — U0003 INFECTIOUS AGENT DETECTION BY NUCLEIC ACID (DNA OR RNA); SEVERE ACUTE RESPIRATORY SYNDROME CORONAVIRUS 2 (SARS-COV-2) (CORONAVIRUS DISEASE [COVID-19]), AMPLIFIED PROBE TECHNIQUE, MAKING USE OF HIGH THROUGHPUT TECHNOLOGIES AS DESCRIBED BY CMS-2020-01-R: HCPCS | Performed by: FAMILY MEDICINE

## 2022-01-05 NOTE — PROGRESS NOTES
Chief complaint: covid exposure    Last close contact exposure a wek ago   Asymptomatic no symptoms    No Known Allergies    Past Medical History:   Diagnosis Date     Type 2 diabetes mellitus with hyperglycemia, without long-term current use of insulin (H) 10/14/2021     Vertigo 2013       atorvastatin (LIPITOR) 20 MG tablet, Take 1 tablet (20 mg) by mouth daily For cholesterol.  metFORMIN (GLUCOPHAGE-XR) 500 MG 24 hr tablet, Take 1 tab twice daily with meals for 1 week then 2 tabs twice daily with meals thereafter. For diabetes.  Omega-3 Fish Oil 500 MG capsule, Take 4 capsules (2,000 mg) by mouth daily  acetaminophen (TYLENOL) 325 MG tablet, Take 2 tablets (650 mg) by mouth every 6 hours as needed for mild pain (Patient not taking: Reported on 1/5/2022)    No current facility-administered medications on file prior to visit.      Social History     Tobacco Use     Smoking status: Never Smoker     Smokeless tobacco: Never Used   Substance Use Topics     Alcohol use: No     Drug use: No       ROS:  review of systems negative except for noted above.       OBJECTIVE:  /74 (BP Location: Left arm, Patient Position: Sitting, Cuff Size: Adult Regular)   Pulse 75   Temp 97.7  F (36.5  C) (Tympanic)   Wt 66.2 kg (145 lb 14.4 oz)   SpO2 96%   BMI 26.85 kg/m     General:   awake, alert, and cooperative.  NAD.   Head: Normocephalic, atraumatic.  Eyes: Conjunctiva clear  Neuro: Alert and oriented - normal speech.  MS: Using extremities freely  PSYCH:  Normal affect, normal speech  SKIN: no obvious rashes    ASSESSMENT:    ICD-10-CM    1. Close exposure to COVID-19 virus  Z20.822 Asymptomatic COVID-19 Virus (Coronavirus) by PCR Nose   2. Type 2 diabetes mellitus with hyperglycemia, without long-term current use of insulin (H)  E11.65        PLAN:   Already been a week. If covid negative may discontinue isolation as long as continues to have no symptoms  DM per patient stable - follow up with primary care provider  recommended     Advised about symptoms which might herald more serious problems.        Radha Matute MD

## 2022-01-06 LAB — SARS-COV-2 RNA RESP QL NAA+PROBE: NEGATIVE

## 2022-03-27 ENCOUNTER — TRANSFERRED RECORDS (OUTPATIENT)
Dept: HEALTH INFORMATION MANAGEMENT | Facility: CLINIC | Age: 57
End: 2022-03-27
Payer: COMMERCIAL

## 2022-03-27 LAB — RETINOPATHY: NORMAL

## 2022-05-25 ENCOUNTER — TELEPHONE (OUTPATIENT)
Dept: FAMILY MEDICINE | Facility: CLINIC | Age: 57
End: 2022-05-25
Payer: COMMERCIAL

## 2022-05-25 NOTE — TELEPHONE ENCOUNTER
Patient Quality Outreach    Patient is due for the following:   Diabetes -  A1C, Eye Exam, Microalbumin and Diabetic Follow-Up Visit    NEXT STEPS:   Schedule a office visit for diabetic follow up (in-person)    Type of outreach:    Phone, left message for patient/parent to call back.    Questions for provider review:    None     Thai Thao

## 2022-06-03 ENCOUNTER — VIRTUAL VISIT (OUTPATIENT)
Dept: FAMILY MEDICINE | Facility: CLINIC | Age: 57
End: 2022-06-03
Payer: COMMERCIAL

## 2022-06-03 ENCOUNTER — LAB (OUTPATIENT)
Dept: LAB | Facility: CLINIC | Age: 57
End: 2022-06-03

## 2022-06-03 DIAGNOSIS — E11.65 TYPE 2 DIABETES MELLITUS WITH HYPERGLYCEMIA, WITHOUT LONG-TERM CURRENT USE OF INSULIN (H): Primary | ICD-10-CM

## 2022-06-03 DIAGNOSIS — E78.5 HYPERLIPIDEMIA LDL GOAL <100: ICD-10-CM

## 2022-06-03 DIAGNOSIS — E11.65 TYPE 2 DIABETES MELLITUS WITH HYPERGLYCEMIA, WITHOUT LONG-TERM CURRENT USE OF INSULIN (H): ICD-10-CM

## 2022-06-03 LAB
ALBUMIN SERPL-MCNC: 4.1 G/DL (ref 3.4–5)
ALP SERPL-CCNC: 59 U/L (ref 40–150)
ALT SERPL W P-5'-P-CCNC: 18 U/L (ref 0–50)
ANION GAP SERPL CALCULATED.3IONS-SCNC: 6 MMOL/L (ref 3–14)
AST SERPL W P-5'-P-CCNC: 14 U/L (ref 0–45)
BILIRUB SERPL-MCNC: 0.8 MG/DL (ref 0.2–1.3)
BUN SERPL-MCNC: 9 MG/DL (ref 7–30)
CALCIUM SERPL-MCNC: 9.3 MG/DL (ref 8.5–10.1)
CHLORIDE BLD-SCNC: 106 MMOL/L (ref 94–109)
CHOLEST SERPL-MCNC: 143 MG/DL
CO2 SERPL-SCNC: 29 MMOL/L (ref 20–32)
CREAT SERPL-MCNC: 0.58 MG/DL (ref 0.52–1.04)
CREAT UR-MCNC: 101 MG/DL
FASTING STATUS PATIENT QL REPORTED: YES
GFR SERPL CREATININE-BSD FRML MDRD: >90 ML/MIN/1.73M2
GLUCOSE BLD-MCNC: 152 MG/DL (ref 70–99)
HBA1C MFR BLD: 8.1 % (ref 0–5.6)
HDLC SERPL-MCNC: 67 MG/DL
LDLC SERPL CALC-MCNC: 58 MG/DL
MICROALBUMIN UR-MCNC: 21 MG/L
MICROALBUMIN/CREAT UR: 20.79 MG/G CR (ref 0–25)
NONHDLC SERPL-MCNC: 76 MG/DL
POTASSIUM BLD-SCNC: 4.3 MMOL/L (ref 3.4–5.3)
PROT SERPL-MCNC: 7.9 G/DL (ref 6.8–8.8)
SODIUM SERPL-SCNC: 141 MMOL/L (ref 133–144)
TRIGL SERPL-MCNC: 90 MG/DL

## 2022-06-03 PROCEDURE — 80053 COMPREHEN METABOLIC PANEL: CPT

## 2022-06-03 PROCEDURE — 80061 LIPID PANEL: CPT

## 2022-06-03 PROCEDURE — 36415 COLL VENOUS BLD VENIPUNCTURE: CPT

## 2022-06-03 PROCEDURE — 83036 HEMOGLOBIN GLYCOSYLATED A1C: CPT

## 2022-06-03 PROCEDURE — 82043 UR ALBUMIN QUANTITATIVE: CPT

## 2022-06-03 PROCEDURE — 99214 OFFICE O/P EST MOD 30 MIN: CPT | Mod: 95 | Performed by: FAMILY MEDICINE

## 2022-06-03 RX ORDER — METFORMIN HCL 500 MG
1000 TABLET, EXTENDED RELEASE 24 HR ORAL 2 TIMES DAILY WITH MEALS
Qty: 360 TABLET | Refills: 1 | Status: SHIPPED | OUTPATIENT
Start: 2022-06-03 | End: 2022-09-09

## 2022-06-03 RX ORDER — ATORVASTATIN CALCIUM 20 MG/1
20 TABLET, FILM COATED ORAL DAILY
Qty: 90 TABLET | Refills: 3 | Status: SHIPPED | OUTPATIENT
Start: 2022-06-03 | End: 2022-09-09

## 2022-06-03 NOTE — PROGRESS NOTES
Antonina is a 56 year old who is being evaluated via a billable telephone visit.      What phone number would you like to be contacted at? [phone]  How would you like to obtain your AVS? Mail a copy      Subjective   Antonina is a 56 year old who presents for the following health issues:    HPI     Diabetes Follow-up      How often are you checking your blood sugar? Not at all    What concerns do you have today about your diabetes? None     Do you have any of these symptoms? (Select all that apply)  Numbness in the left hand    Have you had a diabetic eye exam in the last 12 months? Yes- Date of last eye exam: 3/27/22,  Location: Dr. Byrnes        BP Readings from Last 2 Encounters:   01/05/22 119/74   10/13/21 135/87     Hemoglobin A1C (%)   Date Value   10/13/2021 >15.0 (H)     LDL Cholesterol Calculated (mg/dL)   Date Value   10/13/2021 148 (H)   03/12/2013 167 (H)   03/23/2011 158               Hyperlipidemia Follow-Up      Are you regularly taking any medication or supplement to lower your cholesterol?   Yes- Lipitor    Are you having muscle aches or other side effects that you think could be caused by your cholesterol lowering medication?  No      How many servings of fruits and vegetables do you eat daily?  0-1    On average, how many sweetened beverages do you drink each day (Examples: soda, juice, sweet tea, etc.  Do NOT count diet or artificially sweetened beverages)?   0    How many days per week do you exercise enough to make your heart beat faster? none    How many minutes a day do you exercise enough to make your heart beat faster? n/a    How many days per week do you miss taking your medication? 0      Review of Systems   Constitutional, HEENT, cardiovascular, pulmonary, GI, , musculoskeletal, neuro, skin, endocrine and psych systems are negative, except as otherwise noted.      Objective           Vitals:  No vitals were obtained today due to virtual visit.    Physical Exam   healthy, alert and no  distress  PSYCH: Alert and oriented times 3; coherent speech, normal   rate and volume, able to articulate logical thoughts, able   to abstract reason, no tangential thoughts, no hallucinations   or delusions  Her affect is normal  RESP: No cough, no audible wheezing, able to talk in full sentences  Remainder of exam unable to be completed due to telephone visits    A/P:  (E11.65) Type 2 diabetes mellitus with hyperglycemia, without long-term current use of insulin (H)  (primary encounter diagnosis)  Comment:   Plan: metFORMIN (GLUCOPHAGE XR) 500 MG 24 hr tablet,         Hemoglobin A1c, Comprehensive metabolic panel         (BMP + Alb, Alk Phos, ALT, AST, Total. Bili,         TP), Albumin Random Urine Quantitative with         Creat Ratio        Patient has been only taking metformin 2 tabs in the morning. Will increase to 2 tabs BID. Last hemoglobin a1c was >15. We discussed likely needing insulin but patient is scared of this. Will recheck labs today. Would like patient to follow up in 2 months. Discussed if labs do not improve, we should start long-acting insulin.    (E78.5) Hyperlipidemia LDL goal <100  Comment:   Plan: atorvastatin (LIPITOR) 20 MG tablet, Lipid         panel reflex to direct LDL Fasting,         Comprehensive metabolic panel (BMP + Alb, Alk         Phos, ALT, AST, Total. Bili, TP)        Recheck while on atorvastatin.        Dez Raya MD          Phone call duration: 9 minutes

## 2022-06-07 ENCOUNTER — TELEPHONE (OUTPATIENT)
Dept: FAMILY MEDICINE | Facility: CLINIC | Age: 57
End: 2022-06-07
Payer: COMMERCIAL

## 2022-06-07 NOTE — TELEPHONE ENCOUNTER
Patient is calling to the clinic in regards to results on 6/3/522.     Writer relayed provider message below.     Dez Raya MD   6/6/2022 11:41 AM CDT         Dear Antonina,     Your cholesterol has improved and now at goal. Your diabetes has improved as well but still a little elevated and not at goal. Please take your current medications. Please follow up in 3 months for recheck.     Sincerely,  Dez Raya MD     Patient verbalized understanding. Warm transfer to central scheduling to schedule a diabetes follow up visit in 3 months. No further questions or concerns at this time.    Nay Tamez RN, BSN  [facility]

## 2022-09-09 ENCOUNTER — OFFICE VISIT (OUTPATIENT)
Dept: FAMILY MEDICINE | Facility: CLINIC | Age: 57
End: 2022-09-09
Payer: COMMERCIAL

## 2022-09-09 ENCOUNTER — TELEPHONE (OUTPATIENT)
Dept: FAMILY MEDICINE | Facility: CLINIC | Age: 57
End: 2022-09-09

## 2022-09-09 VITALS
BODY MASS INDEX: 26.36 KG/M2 | HEIGHT: 63 IN | OXYGEN SATURATION: 98 % | SYSTOLIC BLOOD PRESSURE: 134 MMHG | RESPIRATION RATE: 21 BRPM | HEART RATE: 81 BPM | WEIGHT: 148.8 LBS | DIASTOLIC BLOOD PRESSURE: 76 MMHG | TEMPERATURE: 98.2 F

## 2022-09-09 DIAGNOSIS — Z23 ENCOUNTER FOR IMMUNIZATION: ICD-10-CM

## 2022-09-09 DIAGNOSIS — E11.65 TYPE 2 DIABETES MELLITUS WITH HYPERGLYCEMIA, WITHOUT LONG-TERM CURRENT USE OF INSULIN (H): Primary | ICD-10-CM

## 2022-09-09 DIAGNOSIS — E78.5 HYPERLIPIDEMIA LDL GOAL <100: ICD-10-CM

## 2022-09-09 LAB — HBA1C MFR BLD: 7.5 % (ref 0–5.6)

## 2022-09-09 PROCEDURE — 36415 COLL VENOUS BLD VENIPUNCTURE: CPT | Performed by: FAMILY MEDICINE

## 2022-09-09 PROCEDURE — 83036 HEMOGLOBIN GLYCOSYLATED A1C: CPT | Performed by: FAMILY MEDICINE

## 2022-09-09 PROCEDURE — 90682 RIV4 VACC RECOMBINANT DNA IM: CPT | Performed by: FAMILY MEDICINE

## 2022-09-09 PROCEDURE — 90471 IMMUNIZATION ADMIN: CPT | Performed by: FAMILY MEDICINE

## 2022-09-09 PROCEDURE — 99214 OFFICE O/P EST MOD 30 MIN: CPT | Mod: 25 | Performed by: FAMILY MEDICINE

## 2022-09-09 RX ORDER — METFORMIN HCL 500 MG
1000 TABLET, EXTENDED RELEASE 24 HR ORAL 2 TIMES DAILY WITH MEALS
Qty: 360 TABLET | Refills: 1 | Status: SHIPPED | OUTPATIENT
Start: 2022-09-09 | End: 2023-03-27

## 2022-09-09 RX ORDER — ATORVASTATIN CALCIUM 20 MG/1
20 TABLET, FILM COATED ORAL DAILY
Qty: 90 TABLET | Refills: 3 | Status: SHIPPED | OUTPATIENT
Start: 2022-09-09 | End: 2023-06-15

## 2022-09-09 ASSESSMENT — PAIN SCALES - GENERAL: PAINLEVEL: NO PAIN (0)

## 2022-09-09 NOTE — TELEPHONE ENCOUNTER
Patient Quality Outreach    Patient is due for the following:   Breast Cancer Screening - Mammogram  Cervical Cancer Screening - PAP Needed      Topic Date Due     Pneumococcal Vaccine (1 - PCV) Never done     Hepatitis B Vaccine (1 of 3 - Risk 3-dose series) Never done     Zoster (Shingles) Vaccine (1 of 2) Never done     COVID-19 Vaccine (4 - Booster for Moderna series) 03/26/2022     Flu Vaccine (1) 09/01/2022       Next Steps:   Patient has upcoming appointment, these items will be addressed at that time.    Type of outreach:    Sent letter.    Next Steps:  Reach out within 90 days via Letter.    Max number of attempts reached: Yes. Will try again in 90 days if patient still on fail list.    Questions for provider review:    None     Stacy Moreland MA  Chart routed to Provider.      
08-Nov-2019 01:25

## 2022-09-09 NOTE — LETTER
September 12, 2022      Antonina Diana  [address] [place] [address]        Dear ,    We are writing to inform you of your test results.    Your diabetes continues to improve and almost at goal. The goal is under 7 and your was 7.5. If you take the full dose of metformin daily this should continue to improve. Please follow up in 3 months for recheck.      Resulted Orders   HEMOGLOBIN A1C   Result Value Ref Range    Hemoglobin A1C 7.5 (H) 0.0 - 5.6 %      Comment:      Normal <5.7%   Prediabetes 5.7-6.4%    Diabetes 6.5% or higher     Note: Adopted from ADA consensus guidelines.       If you have any questions or concerns, please call the clinic at the number listed above.       Sincerely,      Dez Raya MD

## 2022-09-09 NOTE — PROGRESS NOTES
"Bhakti Sarkar is a 57 year old presenting for the following health issues:  Diabetes      History of Present Illness       Diabetes:   She presents for follow up of diabetes.  She is not checking blood glucose. She has no concerns regarding her diabetes at this time.  She is not experiencing numbness or burning in feet, excessive thirst, blurry vision, weight changes or redness, sores or blisters on feet.         She eats 0-1 servings of fruits and vegetables daily.She consumes 0 sweetened beverage(s) daily.She exercises with enough effort to increase her heart rate 10 to 19 minutes per day.         Hyperlipidemia Follow-Up      Are you regularly taking any medication or supplement to lower your cholesterol?   Yes- atorvastatin    Are you having muscle aches or other side effects that you think could be caused by your cholesterol lowering medication?  No        Review of Systems   Constitutional, HEENT, cardiovascular, pulmonary, GI, , musculoskeletal, neuro, skin, endocrine and psych systems are negative, except as otherwise noted.      Objective    /76 (BP Location: Left arm, Patient Position: Sitting, Cuff Size: Adult Regular)   Pulse 81   Temp 98.2  F (36.8  C) (Oral)   Resp 21   Ht 1.595 m (5' 2.8\")   Wt 67.5 kg (148 lb 12.8 oz)   SpO2 98%   BMI 26.53 kg/m    Body mass index is 26.53 kg/m .  Physical Exam   GENERAL: healthy, alert and no distress  NECK: no adenopathy, no asymmetry, masses, or scars and thyroid normal to palpation  RESP: lungs clear to auscultation - no rales, rhonchi or wheezes  CV: regular rate and rhythm, normal S1 S2, no S3 or S4, no murmur, click or rub, no peripheral edema and peripheral pulses strong  ABDOMEN: soft, nontender, no hepatosplenomegaly, no masses and bowel sounds normal  MS: no gross musculoskeletal defects noted, no edema    A/P:  (E11.65) Type 2 diabetes mellitus with hyperglycemia, without long-term current use of insulin (H)  (primary encounter " diagnosis)  Comment:   Plan: HEMOGLOBIN A1C, metFORMIN (GLUCOPHAGE XR) 500         MG 24 hr tablet        Patient has not been taking full dose of metformin. Sometimes forgetting in the evening. Okay to take all 4 tabs once daily. Recheck in 3 months.    (E78.5) Hyperlipidemia LDL goal <100  Comment:   Plan: atorvastatin (LIPITOR) 20 MG tablet        Controlled.    (Z23) Encounter for immunization  Comment:   Plan: INFLUENZA QUAD, PF (RIV4) (FLUBLOK)            Dez Raya MD

## 2022-09-09 NOTE — PATIENT INSTRUCTIONS
At [facility], we strive to deliver an exceptional experience to you, every time we see you. If you receive a survey, please complete it as we do value your feedback.  If you have MyChart, you can expect to receive results automatically within 24 hours of their completion.  Your provider will send a note interpreting your results as well.   If you do not have MyChart, you should receive your results in about a week by mail.    Your care team:                            Family Medicine Internal Medicine   MD Tom Gordillo MD Shantel Branch-Fleming, MD Srinivasa Vaka, MD Katya Belousova, JOSUE Vidales CNP, MD ([facility]   Cm Sommer, MD Dorcas Krishnan MD Amelia Massimini APRN CNP Kim Thein, APRN CNP Bethany Templen, MD             Clinic hours: Monday - Thursday 7 am-6 pm; Fridays 7 am-5 pm.   Urgent care: Monday - Friday 10 am- 8 pm; Saturday and Sunday 9 am-5 pm.    Clinic: [phone]       [facility]: Monday - Thursday 8 am - 7 pm; Friday 8 am - 6 pm  [facility]: [phone]

## 2022-09-09 NOTE — LETTER
[address] [address]  [phone]  Dept: [phone]    September 9, 2022    Antonina Diana  [address] [place] [address]    Dear Antonina Diana,     At [facility] we care about your health and are committed to providing quality patient care.    Which includes staying current on preventive cancer screenings.  You can increase your chances of finding and treating cancers through regular screenings.      Our records indicate you may be due for the following preventive screening(s):    Mammogram    Mammogram for breast cancer   Recommended every 1-2 years for women age 50 and older  Mammograms help detect breast cancer, which is the most common cancer among women in the [place].  You may need to start having mammograms earlier and more often if you have had breast cancer, breast problems, or a family history of breast cancer.     Cervical Cancer Screening    Pap smear is a screening test used to detect cervical cancer. It is recommended every three years for women 21 and older. The test should be done at least once every three years but women who are at greater risk for cervical cancer may need to have the test more often.  Immunizations    To schedule an appointment or discuss this screening further, you may contact us by phone at the [facility] at [phone] or online through the patient [email or web address] @ [email or web address]    If you have had any of the screenings listed above at another facility, please call us so that we may update your chart.      Your partners in health,      Quality Committee at [facility]

## 2022-09-20 ENCOUNTER — TELEPHONE (OUTPATIENT)
Dept: FAMILY MEDICINE | Facility: CLINIC | Age: 57
End: 2022-09-20

## 2022-09-20 DIAGNOSIS — R11.2 NAUSEA AND VOMITING, INTRACTABILITY OF VOMITING NOT SPECIFIED, UNSPECIFIED VOMITING TYPE: Primary | ICD-10-CM

## 2022-09-20 RX ORDER — METOCLOPRAMIDE 10 MG/1
10 TABLET ORAL EVERY 6 HOURS PRN
Qty: 30 TABLET | Refills: 1 | Status: SHIPPED | OUTPATIENT
Start: 2022-09-20

## 2022-09-20 NOTE — TELEPHONE ENCOUNTER
Called and left patient a detailed voicemail message regarding provider message and instructions, as noted below.  Advised for patient to call back and speak with nurse with any additional questions.      Taylor Davis RN  [facility]

## 2022-09-20 NOTE — TELEPHONE ENCOUNTER
Please see notes from [facility] ER 9/20/2022.  Patient sister called, placed patient on phone due to no consent to communicate on file.  Patient still experiencing nausea.  Patient did vomit x 1 on the way home from the emergency room. Patient ate some soup but did not finish due to nausea. No vomiting afterwards.    Patient referred to urology for kidney stone follow up. Has not called yet due to not feeling well.     Patient took ondansetron 8 mg at 1400 and is still experiencing nausea. Patient and sister state it is not working.     Advised patient to try small amounts of intake like water and crackers and then progress if that stays down. Patient seems unwilling to do this,    Please advise.    Lana Lopez RN  [facility]

## 2022-09-20 NOTE — TELEPHONE ENCOUNTER
Please let patient know that another nausea medication, Reglan, was sent to her pharmacy to take every 6 hours as needed.    Dez Raya MD

## 2022-09-22 ENCOUNTER — TELEPHONE (OUTPATIENT)
Dept: FAMILY MEDICINE | Facility: CLINIC | Age: 57
End: 2022-09-22

## 2022-09-22 ENCOUNTER — VIRTUAL VISIT (OUTPATIENT)
Dept: FAMILY MEDICINE | Facility: CLINIC | Age: 57
End: 2022-09-22
Payer: COMMERCIAL

## 2022-09-22 DIAGNOSIS — N20.1 CALCULUS OF DISTAL LEFT URETER: Primary | ICD-10-CM

## 2022-09-22 PROCEDURE — 99213 OFFICE O/P EST LOW 20 MIN: CPT | Mod: 95 | Performed by: FAMILY MEDICINE

## 2022-09-22 RX ORDER — TAMSULOSIN HYDROCHLORIDE 0.4 MG/1
0.4 CAPSULE ORAL DAILY
Qty: 20 CAPSULE | Refills: 0 | Status: SHIPPED | OUTPATIENT
Start: 2022-09-22 | End: 2022-10-18

## 2022-09-22 NOTE — PROGRESS NOTES
"Antonina is a 57 year old who is being evaluated via a billable telephone visit.      What phone number would you like to be contacted at? [phone]  How would you like to obtain your AVS? Mail a copy    Assessment & Plan     Calculus of distal left ureter  -CT in ER (9/20/22) showed  1. Mild left-sided hydronephrosis due to a 4 x 3 mm distal ureteral stone.   2. A few gallstones, the gallbladder is otherwise normal.     -Encouraged hydration, ibuprofen Q6H as needed, strain urine, prescribed Flomax.  -She need labs checked CBC and CMP tomorrow.  -Follow-up in the clinic in 1 week if symptoms still persist, will plan for repeat CT scan to look at the stone and kidneys.           BMI:   Estimated body mass index is 26.53 kg/m  as calculated from the following:    Height as of 9/9/22: 1.595 m (5' 2.8\").    Weight as of 9/9/22: 67.5 kg (148 lb 12.8 oz).           No follow-ups on file.    Katerina Rosa MD  [facility]    Bhakti Sarkar is a 57 year old, presenting for the following health issues:  Abdominal Pain    Antonina has been having pain in her left lower back since 9/19/22.    She went to ER on 9/20/2022, CT scan done showed mild left-sided hydronephrosis and 4 x3 millimeters distal ureteral stone  She was prescribed Zofran and fentanyl and was sent home on hydrocodone, ibuprofen, Flomax.  White count was elevated at 17.2 in the ER.  Antonina still has the pain and was asking for hydrocodone refill.    ED/UC Followup:    Facility:  [facility]  Date of visit: 9/20/22  Reason for visit: Abdominal Pain  Current Status: still having pain 5/10     Review of Systems   Constitutional, HEENT, cardiovascular, pulmonary, gi and gu systems are negative, except as otherwise noted.      Objective           Vitals:  No vitals were obtained today due to virtual visit.    Physical Exam   healthy, alert,  and mild distress  PSYCH: Alert and oriented times 3; coherent speech, normal "   rate and volume, able to articulate logical thoughts, able   to abstract reason, no tangential thoughts, no hallucinations   or delusions  Her affect is normal  RESP: No cough, no audible wheezing, able to talk in full sentences  Remainder of exam unable to be completed due to telephone visits      Phone call duration:20 minutes    Answers for HPI/ROS submitted by the patient on 9/9/2022  Frequency of checking blood sugars:: not at all  Diabetic concerns:: none  Paraesthesia present:: none of these symptoms  How many servings of fruits and vegetables do you eat daily?: 0-1  On average, how many sweetened beverages do you drink each day (Examples: soda, juice, sweet tea, etc.  Do NOT count diet or artificially sweetened beverages)?: 0  How many minutes a day do you exercise enough to make your heart beat faster?: 10 to 19

## 2022-09-22 NOTE — TELEPHONE ENCOUNTER
Pt called back requesting to be seen sooner.     Offered virtual visit today to get pain medication.      Isabel Bain RN  [facility]

## 2022-09-22 NOTE — TELEPHONE ENCOUNTER
Pt called because she was seen in the hospital on 9/20 and was only given about 6 pain meds.    Pt is wanting a refill on pain medication.    Advised pt that a follow up visit is needed to get refills.    Pt only wants to see her PCP.    Scheduled soonest in clinic.      Isabel Bain RN  [facility]

## 2022-09-27 ENCOUNTER — LAB (OUTPATIENT)
Dept: LAB | Facility: CLINIC | Age: 57
End: 2022-09-27
Payer: COMMERCIAL

## 2022-09-27 DIAGNOSIS — N20.1 CALCULUS OF DISTAL LEFT URETER: ICD-10-CM

## 2022-09-27 LAB
BASOPHILS # BLD AUTO: 0 10E3/UL (ref 0–0.2)
BASOPHILS NFR BLD AUTO: 0 %
EOSINOPHIL # BLD AUTO: 0.5 10E3/UL (ref 0–0.7)
EOSINOPHIL NFR BLD AUTO: 8 %
ERYTHROCYTE [DISTWIDTH] IN BLOOD BY AUTOMATED COUNT: 12 % (ref 10–15)
HCT VFR BLD AUTO: 38 % (ref 35–47)
HGB BLD-MCNC: 12.4 G/DL (ref 11.7–15.7)
IMM GRANULOCYTES # BLD: 0 10E3/UL
IMM GRANULOCYTES NFR BLD: 0 %
LYMPHOCYTES # BLD AUTO: 1.9 10E3/UL (ref 0.8–5.3)
LYMPHOCYTES NFR BLD AUTO: 29 %
MCH RBC QN AUTO: 30 PG (ref 26.5–33)
MCHC RBC AUTO-ENTMCNC: 32.6 G/DL (ref 31.5–36.5)
MCV RBC AUTO: 92 FL (ref 78–100)
MONOCYTES # BLD AUTO: 0.4 10E3/UL (ref 0–1.3)
MONOCYTES NFR BLD AUTO: 6 %
NEUTROPHILS # BLD AUTO: 3.8 10E3/UL (ref 1.6–8.3)
NEUTROPHILS NFR BLD AUTO: 58 %
PLATELET # BLD AUTO: 243 10E3/UL (ref 150–450)
RBC # BLD AUTO: 4.14 10E6/UL (ref 3.8–5.2)
WBC # BLD AUTO: 6.6 10E3/UL (ref 4–11)

## 2022-09-27 PROCEDURE — 36415 COLL VENOUS BLD VENIPUNCTURE: CPT

## 2022-09-27 PROCEDURE — 80053 COMPREHEN METABOLIC PANEL: CPT

## 2022-09-27 PROCEDURE — 85025 COMPLETE CBC W/AUTO DIFF WBC: CPT

## 2022-09-28 LAB
ALBUMIN SERPL-MCNC: 3.9 G/DL (ref 3.4–5)
ALP SERPL-CCNC: 54 U/L (ref 40–150)
ALT SERPL W P-5'-P-CCNC: 14 U/L (ref 0–50)
ANION GAP SERPL CALCULATED.3IONS-SCNC: 4 MMOL/L (ref 3–14)
AST SERPL W P-5'-P-CCNC: 12 U/L (ref 0–45)
BILIRUB SERPL-MCNC: 0.3 MG/DL (ref 0.2–1.3)
BUN SERPL-MCNC: 11 MG/DL (ref 7–30)
CALCIUM SERPL-MCNC: 9.5 MG/DL (ref 8.5–10.1)
CHLORIDE BLD-SCNC: 105 MMOL/L (ref 94–109)
CO2 SERPL-SCNC: 30 MMOL/L (ref 20–32)
CREAT SERPL-MCNC: 0.58 MG/DL (ref 0.52–1.04)
GFR SERPL CREATININE-BSD FRML MDRD: >90 ML/MIN/1.73M2
GLUCOSE BLD-MCNC: 145 MG/DL (ref 70–99)
POTASSIUM BLD-SCNC: 4 MMOL/L (ref 3.4–5.3)
PROT SERPL-MCNC: 7.9 G/DL (ref 6.8–8.8)
SODIUM SERPL-SCNC: 139 MMOL/L (ref 133–144)

## 2022-10-16 DIAGNOSIS — N20.1 CALCULUS OF DISTAL LEFT URETER: ICD-10-CM

## 2022-10-18 RX ORDER — TAMSULOSIN HYDROCHLORIDE 0.4 MG/1
CAPSULE ORAL
Qty: 20 CAPSULE | Refills: 0 | Status: SHIPPED | OUTPATIENT
Start: 2022-10-18 | End: 2022-11-07

## 2022-11-05 DIAGNOSIS — N20.1 CALCULUS OF DISTAL LEFT URETER: ICD-10-CM

## 2022-11-07 RX ORDER — TAMSULOSIN HYDROCHLORIDE 0.4 MG/1
CAPSULE ORAL
Qty: 20 CAPSULE | Refills: 0 | Status: SHIPPED | OUTPATIENT
Start: 2022-11-07 | End: 2022-12-01

## 2022-12-28 DIAGNOSIS — N20.1 CALCULUS OF DISTAL LEFT URETER: ICD-10-CM

## 2022-12-28 RX ORDER — TAMSULOSIN HYDROCHLORIDE 0.4 MG/1
CAPSULE ORAL
Qty: 20 CAPSULE | Refills: 0 | OUTPATIENT
Start: 2022-12-28

## 2022-12-28 NOTE — TELEPHONE ENCOUNTER
This was prescribed for kidney stones in September . No need to take for a long time.  Please check with her why she is on this long.Thanks.    -Katerina Al MD

## 2023-01-02 ENCOUNTER — TELEPHONE (OUTPATIENT)
Dept: FAMILY MEDICINE | Facility: CLINIC | Age: 58
End: 2023-01-02

## 2023-01-02 NOTE — TELEPHONE ENCOUNTER
Patient Quality Outreach    Patient is due for the following:   Breast Cancer Screening - Mammogram    Next Steps:   Patient was scheduled for mammogram    Type of outreach:    Phone, spoke to patient/parent. scheduled patient for 02/10/2023      Questions for provider review:    None     Nydia Arriaga

## 2023-01-14 ENCOUNTER — HEALTH MAINTENANCE LETTER (OUTPATIENT)
Age: 58
End: 2023-01-14

## 2023-02-23 ENCOUNTER — APPOINTMENT (OUTPATIENT)
Dept: INTERPRETER SERVICES | Facility: CLINIC | Age: 58
End: 2023-02-23
Payer: COMMERCIAL

## 2023-03-25 DIAGNOSIS — E11.65 TYPE 2 DIABETES MELLITUS WITH HYPERGLYCEMIA, WITHOUT LONG-TERM CURRENT USE OF INSULIN (H): ICD-10-CM

## 2023-03-27 RX ORDER — METFORMIN HCL 500 MG
1000 TABLET, EXTENDED RELEASE 24 HR ORAL 2 TIMES DAILY WITH MEALS
Qty: 120 TABLET | Refills: 0 | Status: SHIPPED | OUTPATIENT
Start: 2023-03-27 | End: 2023-04-26

## 2023-04-23 ENCOUNTER — HEALTH MAINTENANCE LETTER (OUTPATIENT)
Age: 58
End: 2023-04-23

## 2023-04-26 DIAGNOSIS — E11.65 TYPE 2 DIABETES MELLITUS WITH HYPERGLYCEMIA, WITHOUT LONG-TERM CURRENT USE OF INSULIN (H): ICD-10-CM

## 2023-04-26 RX ORDER — METFORMIN HCL 500 MG
1000 TABLET, EXTENDED RELEASE 24 HR ORAL 2 TIMES DAILY WITH MEALS
Qty: 120 TABLET | Refills: 0 | Status: SHIPPED | OUTPATIENT
Start: 2023-04-26 | End: 2023-09-14

## 2023-06-15 DIAGNOSIS — E78.5 HYPERLIPIDEMIA LDL GOAL <100: ICD-10-CM

## 2023-06-15 RX ORDER — ATORVASTATIN CALCIUM 20 MG/1
20 TABLET, FILM COATED ORAL DAILY
Qty: 90 TABLET | Refills: 0 | Status: SHIPPED | OUTPATIENT
Start: 2023-06-15 | End: 2023-09-14

## 2023-07-14 ENCOUNTER — TELEPHONE (OUTPATIENT)
Dept: FAMILY MEDICINE | Facility: CLINIC | Age: 58
End: 2023-07-14
Payer: COMMERCIAL

## 2023-07-14 ENCOUNTER — MYC MEDICAL ADVICE (OUTPATIENT)
Dept: FAMILY MEDICINE | Facility: CLINIC | Age: 58
End: 2023-07-14

## 2023-07-14 NOTE — TELEPHONE ENCOUNTER
Patient Quality Outreach    Patient is due for the following:   Diabetes -  A1C, Eye Exam, Microalbumin and Diabetic Follow-Up Visit  Colon Cancer Screening  Breast Cancer Screening - Mammogram  Cervical Cancer Screening - PAP Needed  Depression  -  PHQ-9 needed  Physical Preventive Adult Physical      Topic Date Due    Hepatitis B Vaccine (1 of 3 - 3-dose series) Never done    Pneumococcal Vaccine (1 - PCV) Never done    Zoster (Shingles) Vaccine (1 of 2) Never done    COVID-19 Vaccine (4 - Moderna series) 01/21/2022    Diptheria Tetanus Pertussis (DTAP/TDAP/TD) Vaccine (3 - Td or Tdap) 03/12/2023       Next Steps:   Schedule a nurse only visit for immunizations office visit for Diabetes follow up and office visit for Adult Preventative    Type of outreach:    Sent Diabetica message.      Questions for provider review:    None           Fabiola Bueno MA  Patient Quality Outreach    Patient is due for the following:   Diabetes -  Eye Exam, Microalbumin, and Foot Exam  Colon Cancer Screening  Breast Cancer Screening - Mammogram  Cervical Cancer Screening - PAP Needed  Physical Preventive Adult Physical      Topic Date Due    Hepatitis B Vaccine (1 of 3 - 3-dose series) Never done    Pneumococcal Vaccine (1 - PCV) Never done    Zoster (Shingles) Vaccine (1 of 2) Never done    COVID-19 Vaccine (4 - Moderna series) 01/21/2022    Diptheria Tetanus Pertussis (DTAP/TDAP/TD) Vaccine (3 - Td or Tdap) 03/12/2023       Next Steps:   Schedule a Adult Preventative    Type of outreach:    Sent letter.    Next Steps:  Reach out within 90 days via Letter.    Max number of attempts reached: Yes. Will try again in 90 days if patient still on fail list.    Questions for provider review:    None           Charisma Mcelroy MA

## 2023-07-14 NOTE — LETTER
August 17, 2023    Antonina Diana  [address] [place] [address]    Dear Antonina    At [facility] we care about your health and are committed to providing quality patient care.     Here is a list of Health Maintenance topics that are due now or due soon:  Health Maintenance Due   Topic Date Due    DIABETIC FOOT EXAM  Never done    HEPATITIS B IMMUNIZATION (1 of 3 - 3-dose series) Never done    Pneumococcal Vaccine: Pediatrics (0 to 5 Years) and At-Risk Patients (6 to 64 Years) (1 - PCV) Never done    MAMMO SCREENING  06/01/2012    PAP  06/01/2014    ZOSTER IMMUNIZATION (1 of 2) Never done    COVID-19 Vaccine (4 - Moderna series) 01/21/2022    YEARLY PREVENTIVE VISIT  10/13/2022    ANNUAL REVIEW OF HM ORDERS  10/13/2022    COLORECTAL CANCER SCREENING  10/13/2022    A1C  12/09/2022    PHQ-2 (once per calendar year)  01/01/2023    DTAP/TDAP/TD IMMUNIZATION (3 - Td or Tdap) 03/12/2023    EYE EXAM  03/27/2023    LIPID  06/03/2023    MICROALBUMIN  06/03/2023        We are recommending that you:  Schedule a WELLNESS (Preventative/Physical) APPOINTMENT with your primary care provider. If you go elsewhere for your wellness appointments then please disregard this reminder    ,   Schedule a MAMMOGRAM which is due.    1 in 8 women will develop invasive breast cancer during her lifetime and it is the most common non-skin cancer in [place] women.  EARLY detection, new treatments, and a better understanding of the disease have increased survival rates - the 5 year survival rate in the 1960s was 63% and today it is close to 90%.    If you are under/uninsured, we recommend you contact the [facility]. They offer mammograms at no charge or on a sliding fee charge. You can schedule with them at [phone]. Please have them send us the results.      Please disregard this reminder if you have had this exam elsewhere within the last year.  It would be helpful for us to have a copy of your mammogram  report in your file so that we can best coordinate your care - please contact us with when your test was done so we can update your record.    ,   Schedule a COLONOSCOPY to assess for colon cancer (due every 10 years or 5 years in higher risk situations.)       Colon cancer is now the second leading cause of cancer-related deaths in the [place] for both men and women and there are over 130,000 new cases and 50,000 deaths per year from colon cancer.  Colonoscopies can prevent 90-95% of these deaths.  Problem lesions can be removed before they ever become cancer.  This test is not only looking for cancer, but also getting rid of precancerious lesions.    If you are under/uninsured, we recommend you contact the [facility] program. [facility] is a free colorectal cancer screening program that provides colonoscopies for eligible under/uninsured [place] men and women. If you are interested in receiving a free colonoscopy, please call [facility] at [phone] (mention code [employer]) to see if you re eligible.     If you do not wish to do a colonoscopy or cannot afford to do one, at this time, there is another option. It is called a FIT test or Fecal Immunochemical Occult Blood Test (take home stool sample kit).  It does not replace the colonoscopy for colorectal cancer screening, but it can detect hidden bleeding in the lower colon.  It does need to be repeated every year and if a positive result is obtained, you would be referred for a colonoscopy.    If you have completed either one of these tests at another facility, please call/respond to this message with the details of when and where the tests were done and if they were normal or not. Or have the records sent to our clinic so that we can best coordinate your care.,   Schedule a PAP SMEAR EXAM which is due.  Please disregard this reminder if you have had this exam elsewhere within the last year.  It would be helpful for us to have a copy of your recent  pap smear report in our file so that we can best coordinate your care.    If you are under/uninsured, we recommend you contact the [facility]. They offer pap smears at no charge or on a sliding fee charge. You can schedule with them at [phone]. Please have them send us the results.    ,    Diabetic Eye Exam is due:  If this was done within the last 12 months then please contact the clinic or respond to this message with the date and location so we can update your records.    , and   Schedule a Nurse-Only appointment to update your immunizations: Your records indicate that you are not up to date with your immunizations, please schedule a nurse-only appointment to get these updated or update them at your next office visit. If this is incorrect, please disregard.    To schedule an appointment or discuss this further, you may contact us by phone at the [facility] at [phone] or online through the patient [email or web address] @ [email or web address]    Thank you for trusting [facility] and we appreciate the opportunity to serve you.  We look forward to supporting your healthcare needs in the future.    Your partners in health,      Quality Committee at [facility]

## 2023-09-14 ENCOUNTER — TELEPHONE (OUTPATIENT)
Dept: NURSING | Facility: CLINIC | Age: 58
End: 2023-09-14

## 2023-09-14 ENCOUNTER — VIRTUAL VISIT (OUTPATIENT)
Dept: FAMILY MEDICINE | Facility: CLINIC | Age: 58
End: 2023-09-14
Payer: COMMERCIAL

## 2023-09-14 ENCOUNTER — LAB (OUTPATIENT)
Dept: FAMILY MEDICINE | Facility: CLINIC | Age: 58
End: 2023-09-14

## 2023-09-14 DIAGNOSIS — E78.5 HYPERLIPIDEMIA LDL GOAL <100: ICD-10-CM

## 2023-09-14 DIAGNOSIS — Z12.11 SCREEN FOR COLON CANCER: ICD-10-CM

## 2023-09-14 DIAGNOSIS — E11.65 TYPE 2 DIABETES MELLITUS WITH HYPERGLYCEMIA, WITHOUT LONG-TERM CURRENT USE OF INSULIN (H): Primary | ICD-10-CM

## 2023-09-14 PROCEDURE — 99214 OFFICE O/P EST MOD 30 MIN: CPT | Mod: 95 | Performed by: FAMILY MEDICINE

## 2023-09-14 RX ORDER — ATORVASTATIN CALCIUM 20 MG/1
20 TABLET, FILM COATED ORAL DAILY
Qty: 90 TABLET | Refills: 1 | Status: SHIPPED | OUTPATIENT
Start: 2023-09-14 | End: 2024-03-28

## 2023-09-14 RX ORDER — METFORMIN HCL 500 MG
1000 TABLET, EXTENDED RELEASE 24 HR ORAL 2 TIMES DAILY WITH MEALS
Qty: 360 TABLET | Refills: 1 | Status: SHIPPED | OUTPATIENT
Start: 2023-09-14 | End: 2024-03-28

## 2023-09-14 NOTE — PROGRESS NOTES
Antonina is a 58 year old who is being evaluated via a billable telephone visit.      What phone number would you like to be contacted at?   How would you like to obtain your AVS? Mail a copy    Distant Location (provider location):  On-site      Subjective   Antonina is a 58 year old, presenting for the following health issues:  Diabetes      9/14/2023     6:56 AM   Additional Questions   Roomed by Nydia       History of Present Illness       Diabetes:   She presents for follow up of diabetes.    She is not checking blood glucose.        She is concerned about other.    She is not experiencing numbness or burning in feet, excessive thirst, blurry vision, weight changes or redness, sores or blisters on feet. The patient has not had a diabetic eye exam in the last 12 months.          She eats 2-3 servings of fruits and vegetables daily.She consumes 0 sweetened beverage(s) daily.She exercises with enough effort to increase her heart rate 10 to 19 minutes per day.  She exercises with enough effort to increase her heart rate 7 days per week.   She is taking medications regularly.     Patient following up on cholesterol. Currently taking atorvastatin without side effects.    Review of Systems   Constitutional, HEENT, cardiovascular, pulmonary, GI, , musculoskeletal, neuro, skin, endocrine and psych systems are negative, except as otherwise noted.      Objective           Vitals:  No vitals were obtained today due to virtual visit.    Physical Exam   healthy, alert, and no distress  PSYCH: Alert and oriented times 3; coherent speech, normal   rate and volume, able to articulate logical thoughts, able   to abstract reason, no tangential thoughts, no hallucinations   or delusions  Her affect is normal  RESP: No cough, no audible wheezing, able to talk in full sentences  Remainder of exam unable to be completed due to telephone visits    A/P:    (E11.65) Type 2 diabetes mellitus with hyperglycemia, without long-term  current use of insulin (H)  (primary encounter diagnosis)  Comment:   Plan: Adult Eye  Referral, metFORMIN         (GLUCOPHAGE XR) 500 MG 24 hr tablet, Hemoglobin        A1c, Basic metabolic panel  (Ca, Cl, CO2,         Creat, Gluc, K, Na, BUN), Albumin Random Urine         Quantitative with Creat Ratio, AMB Adult         Diabetes Educator Referral        Recheck A1c and adjust if needed. Referred to diabetic educator. RTC in 3 months for recheck.    (E78.5) Hyperlipidemia LDL goal <100  Comment:   Plan: atorvastatin (LIPITOR) 20 MG tablet, Lipid         panel reflex to direct LDL Fasting        Recheck and adjust dose if needed.    (Z12.11) Screen for colon cancer  Comment:   Plan: COLOGUARD([employer])              Dez Raya MD          Phone call duration: 9 minutes

## 2023-09-14 NOTE — TELEPHONE ENCOUNTER
Telephone call  Patient calling she had a telephone  appointment with Dr Raya this morning she thought it was at 6 am.  After checking her chart for appointments it is for 645 am  arrival and and 7 am  appointment.  Explained this to the patient so she will wait for the call.    Hermila Bella RN   [facility] Nurse Advisor  6:28 AM 9/14/2023

## 2023-09-30 ENCOUNTER — HEALTH MAINTENANCE LETTER (OUTPATIENT)
Age: 58
End: 2023-09-30

## 2023-10-10 ENCOUNTER — ALLIED HEALTH/NURSE VISIT (OUTPATIENT)
Dept: EDUCATION SERVICES | Facility: CLINIC | Age: 58
End: 2023-10-10
Attending: FAMILY MEDICINE
Payer: COMMERCIAL

## 2023-10-10 DIAGNOSIS — E11.65 TYPE 2 DIABETES MELLITUS WITH HYPERGLYCEMIA, WITHOUT LONG-TERM CURRENT USE OF INSULIN (H): ICD-10-CM

## 2023-10-10 PROCEDURE — G0108 DIAB MANAGE TRN  PER INDIV: HCPCS | Performed by: DIETITIAN, REGISTERED

## 2023-10-10 RX ORDER — BLOOD-GLUCOSE SENSOR
1 EACH MISCELLANEOUS
Qty: 2 EACH | Refills: 11 | Status: SHIPPED | OUTPATIENT
Start: 2023-10-10 | End: 2024-08-26

## 2023-10-10 NOTE — PROGRESS NOTES
Diabetes Self-Management Education & Support    Presents for: Individual review    Type of Service: In Person Visit    Assessment Type:   ASSESSMENT:  Antonina is not currently checking her blood glucose due to fear of needles, but is interested in the personal CGM. Since uncertain if her insurance will cover the sensor, will try for the Marlon 3 since it is the lower cost option and compatible with her phone. Provided overview on use and application of the Marlon 3:    CGM-specific education:   Freestyle Marlon sensor: insertion technique, sensor site location and rotation, insulin administration in relation to sensor placement, sensor wear, reasons to remove sensor (MRI, CT, diathermy), Vitamin C & Aspirin effects on sensor, Marlon Mulino: frequency of scanning sensor, length of time data is visible, use of built in glucose meter, Precision X-tra test strips, and Use of trends and graphs for pattern management and problem solving       Also reviewed diet recall and patient not appear excessive in carbohydrate intake but reviewed carbohydrate sources and used food models to help demonstrate portion control. Answered food related questions and also discussed possible food changes if seeing elevated blood glucose after eating and/or activity since can help lower blood glucose. Reviewed target range of  mg/dL and ok to be higher than 180 mg/dL, as long as lower below this 2 hours post-prandial. Pt verbalized understanding of concepts discussed and recommendations provided.       Patient's most recent   Lab Results   Component Value Date    A1C 7.5 09/09/2022     is not meeting goal of <7.0    Diabetes knowledge and skills assessment:   Patient is knowledgeable in diabetes management concepts related to: Healthy Eating, Taking Medication, and Reducing Risks    Continue education with the following diabetes management concepts: Being Active, Monitoring, Problem Solving, and Healthy Coping    Based on learning assessment  "above, most appropriate setting for further diabetes education would be: Individual setting.      PLAN    -Start Marlon 3 so can check on blood glucose control at follow up.    Topics to cover at upcoming visits: Taking Medication and Problem Solving    Follow-up: 11/21/23    See Care Plan for co-developed, patient-state behavior change goals.  AVS provided for patient today.    Education Materials Provided:  No new materials provided today      SUBJECTIVE/OBJECTIVE:  Presents for: Individual review  Accompanied by: Self  Diabetes education in the past 24mo: No  Focus of Visit: CGM  Type of CGM visit: Personal CGM Start  Diabetes type: Type 2  Date of diagnosis: \"not remember but a long time ago\"  Disease course: Stable  How confident are you filling out medical forms by yourself:: Extremely  Diabetes management related comments/concerns: Say she is interested in a CGM to wear on the back of her arm.  Says she is scared of needles, which is why she never checked blood glucose before. Sister tries to help her with advice as she works in the hospital. Her sister is who recommended the personal CGM.  Transportation concerns: No  Difficulty affording diabetes medication?: No  Other concerns:: English as a second language, Glasses, Other (Says she is having trouble with memory)  Cultural Influences/Ethnic Background:  Not  or       Diabetes Symptoms & Complications:  Neuropathy: No  Polydipsia: Yes  Polyphagia: Sometimes  Visual change: No  Slow healing wounds: No  Complications assessed today?: Yes  Peripheral neuropathy: Yes    Patient Problem List and Family Medical History reviewed for relevant medical history, current medical status, and diabetes risk factors.    Vitals:  There were no vitals taken for this visit.  Estimated body mass index is 26.53 kg/m  as calculated from the following:    Height as of 9/9/22: 1.595 m (5' 2.8\").    Weight as of 9/9/22: 67.5 kg (148 lb 12.8 oz).   Last 3 BP:   BP " Readings from Last 3 Encounters:   09/09/22 134/76   01/05/22 119/74   10/13/21 135/87       History   Smoking Status    Never   Smokeless Tobacco    Never       Labs:  Lab Results   Component Value Date    A1C 7.5 09/09/2022     Lab Results   Component Value Date     09/27/2022     11/18/2014     Lab Results   Component Value Date    LDL 58 06/03/2022     03/12/2013     HDL Cholesterol   Date Value Ref Range Status   03/12/2013 42 (L) 50 - 110 mg/dL Final     Direct Measure HDL   Date Value Ref Range Status   06/03/2022 67 >=50 mg/dL Final   ]  GFR Estimate   Date Value Ref Range Status   09/27/2022 >90 >60 mL/min/1.73m2 Final     Comment:     Effective December 21, 2021 eGFRcr in adults is calculated using the 2021 CKD-EPI creatinine equation which includes age and gender (Matheus presley al., NEJM, DOI: 10.1056/TAAUas7584442)   11/18/2014 >90  Non  GFR Calc   >60 mL/min/1.7m2 Final     GFR Estimate If Black   Date Value Ref Range Status   11/18/2014 >90   GFR Calc   >60 mL/min/1.7m2 Final     Lab Results   Component Value Date    CR 0.58 09/27/2022    CR 0.69 11/18/2014     No results found for: MICROALBUMIN    Healthy Eating:  Healthy Eating Assessed Today: Yes  Cultural/Scientologist diet restrictions?: Yes ([id number])  Meal planning/habits: None  How many times a week on average do you eat food made away from home (restaurant/take-out)?: 5+  Meals include: Breakfast, Lunch, Dinner  Breakfast: Rice OR egg sandwich and coffee and 1 cup of milk and coffee  Lunch: 1 cup or less of rice and veggies /[place] food  Dinner: 1-2 slices of bread and eggs and 2 cups milk OR bowl of noodles - 3 days a week (1 cup or less)  Beverages: Water, Coffee    Being Active:  Being Active Assessed Today: Yes  Exercise:: Yes  Days per week of moderate to strenuous exercise (like a brisk walk): 5 (Walks a lot at work- does  at her job.)  How intense was your  typical exercise? : Light (like stretching or slow walking)    Monitoring:  Monitoring Assessed Today: Yes  Did patient bring glucose meter to appointment? : No  Times checking blood sugar at home (number): Never      Taking Medications:  Diabetes Medication(s)       Biguanides       metFORMIN (GLUCOPHAGE XR) 500 MG 24 hr tablet    Take 2 tablets (1,000 mg) by mouth 2 times daily (with meals)            Taking Medication Assessed Today: Yes  Current Treatments: Oral Medication (taken by mouth)  Problems taking diabetes medications regularly?: No  Diabetes medication side effects?: No    Problem Solving:  Problem Solving Assessed Today: No  Is the patient at risk for hypoglycemia?: No              Reducing Risks:  Reducing Risks Assessed Today: No  Diabetes Risks: Ethnicity, Family History (Mother had diabetes)    Healthy Coping:  Healthy Coping Assessed Today: Yes  Emotional response to diabetes: Ready to learn  Informal Support system:: Family  Stage of change: PREPARATION (Decided to change - considering how)  Patient Activation Measure Survey Score:       No data to display                  Care Plan and Education Provided:  Care Plan: Diabetes   Updates made by Barb Patel RD since 10/10/2023 12:00 AM        Problem: HbA1C Not In Goal         Goal: Establish Regular Follow-Ups with PCP         Task: Discuss with PCP the recommended timing for patient's next follow up visit(s)    Responsible User: Barb Patel RD        Task: Discuss schedule for PCP visits with patient    Responsible User: Barb Patel RD        Goal: Get HbA1C Level in Goal         Task: Educate patient on diabetes education self-management topics    Responsible User: Barb Patel RD        Task: Educate patient on benefits of regular glucose monitoring Completed 10/10/2023   Responsible User: Barb Patel RD        Task: Refer patient to appropriate extended care team member, as needed (Medication Therapy Management,  Behavioral Health, Physical Therapy, etc.)    Responsible User: Barb Patel RD        Task: Discuss diabetes treatment plan with patient    Responsible User: Barb Patel RD        Problem: Diabetes Self-Management Education Needed to Optimize Self-Care Behaviors         Goal: Understand diabetes pathophysiology and disease progression         Task: Provide education on diabetes pathophysiology and disease progression specfic to patient's diabetes type    Responsible User: Barb Patel RD        Goal: Healthy Eating - follow a healthy eating pattern for diabetes         Task: Provide education on portion control and consistency in amount, composition and timing of food intake Completed 10/10/2023   Responsible User: Barb Patel RD        Task: Provide education on managing carbohydrate intake (carbohydrate counting, plate planning method, etc.) Completed 10/10/2023   Responsible User: Barb Patel RD        Task: Provide education on weight management    Responsible User: Barb Patel RD        Task: Provide education on heart healthy eating    Responsible User: Barb Patel RD        Task: Provide education on eating out    Responsible User: Barb Patel RD        Task: Develop individualized healthy eating plan with patient Completed 10/10/2023   Responsible User: Barb Patel RD        Goal: Being Active - get regular physical activity, working up to at least 150 minutes per week         Task: Provide education on relationship of activity to glucose and precautions to take if at risk for low glucose Completed 10/10/2023   Responsible User: Barb Patel RD        Task: Discuss barriers to physical activity with patient    Responsible User: Barb Patel RD        Task: Develop physical activity plan with patient    Responsible User: Barb Patel RD        Task: Explore community resources including walking groups, assistance programs, and home videos    Responsible  User: Barb Patel RD        Goal: Monitoring - monitor glucose and ketones as directed    This Visit's Progress: 0%   Note:    Start wearing Marlon 3 to better assess blood glucose control with current medications.       Task: Provide education on blood glucose monitoring (purpose, proper technique, frequency, glucose targets, interpreting results, when to use glucose control solution, sharps disposal)    Responsible User: Barb Patel RD        Task: Provide education on continuous glucose monitoring (sensor placement, use of katherine or /reader, understanding glucose trends, alerts and alarms, differences between sensor glucose and blood glucose) Completed 10/10/2023   Responsible User: Barb Patel RD        Task: Provide education on ketone monitoring (when to monitor, frequency, etc.)    Responsible User: Barb Patel RD        Goal: Taking Medication - patient is consistently taking medications as directed         Task: Provide education on action of prescribed medication, including when to take and possible side effects Completed 10/10/2023   Responsible User: Barb Patel RD        Task: Provide education on insulin and injectable diabetes medications, including administration, storage, site selection and rotation for injection sites    Responsible User: Barb Patel RD        Task: Discuss barriers to medication adherence with patient and provide management technique ideas as appropriate    Responsible User: Barb Patel RD        Task: Provide education on frequency and refill details of medications    Responsible User: Barb Patel RD        Goal: Problem Solving - know how to prevent and manage short-term diabetes complications         Task: Provide education on high blood glucose - causes, signs/symptoms, prevention and treatment    Responsible User: Barb Patel RD        Task: Provide education on low blood glucose - causes, signs/symptoms, prevention, treatment,  carrying a carbohydrate source at all times, and medical identification    Responsible User: Barb Patel RD   Note:    Discussed symptoms hypoglycemia since low blood glucose may alarm on campos.       Task: Provide education on safe travel with diabetes    Responsible User: Barb Patel RD        Task: Provide education on how to care for diabetes on sick days    Responsible User: Barb Patel RD        Task: Provide education on when to call a health care provider    Responsible User: Barb Patel RD        Goal: Reducing Risks - know how to prevent and treat long-term diabetes complications         Task: Provide education on major complications of diabetes, prevention, early diagnostic measures and treatment of complications    Responsible User: Barb Patel RD        Task: Provide education on recommended care for dental, eye and foot health    Responsible User: Barb Patel RD        Task: Provide education on Hemoglobin A1c - goals and relationship to blood glucose levels    Responsible User: Barb Patel RD        Task: Provide education on recommendations for heart health - lipid levels and goals, blood pressure and goals, and aspirin therapy, if indicated    Responsible User: Barb Patel RD        Task: Provide education on tobacco cessation    Responsible User: Barb Patel RD        Goal: Healthy Coping - use available resources to cope with the challenges of managing diabetes         Task: Discuss recognizing feelings about having diabetes    Responsible User: Barb Patel RD        Task: Provide education on the benefits of making appropriate lifestyle changes    Responsible User: Barb Patel RD        Task: Provide education on benefits of utilizing support systems    Responsible User: Barb Patel RD        Task: Discuss methods for coping with stress    Responsible User: Barb Patel RD        Task: Provide education on when to seek professional  counseling    Responsible User: Barb Patel RD Kaydee Brown, RDN, LD, [facility]     Time Spent: 50 minutes  Encounter Type: Individual    Any diabetes medication dose changes were made via the CDE Protocol per the patient's referring provider. A copy of this encounter was shared with the provider.

## 2023-10-10 NOTE — PATIENT INSTRUCTIONS
Ok to have 1 cup (computer mouse size) of potatoes, rice or noodles OR 1-2 pieces of bread- try to get whole grain if able.      2. Want to spread carbohydrates throughout the day. Vegetables, cheese and eggs will not raise blood glucose.     Marlon 3 Instructions:  1. The first hour after you place the sensor is the warm up period (black out period).  You will need to carry your blood glucose meter and check blood glucose during this time.     2. Check blood sugar if feeling symptoms of hypoglycemia but meter is not displaying a low number- may be some variability between sensor and meter at times.     3. Change sensor every 14 days.     4. The Marlon 3 will pull blood glucose directly into your phone. Marlon 3 has alarms. You can adjust both the high and low blood glucose alarm (when they will go off) or turn off high blood glucose alarm if alarming too much.  You cannot turn off the critical low blood glucose alarm.  -Ok for your blood glucose to rise above 200 mg/dL, just want to make sure it is back below 180 mg/dL after 2 hours.     5. Watch trend arrows- will let you know if blood sugars are rising, falling or stable at the time you check.     6. It is okay to shower, bathe, and swim (up to 3 feet deep for 30 minutes) with sensor.      7. Remove the sensor if you need to have an MRI or CT scan.     8. Do not cover the sensor with extra adhesive (the small hole in the center of the sensor must remain uncovered).  If you have trouble with sensor falling off, these are approved products to help with sensor adhesion: Torbot Skin Tac, SKIN-PREP Protective Barrier Wipe and Mastisol Liquid Adhesive     9. Check the dose if you take a multivitamin or Vitamin C (ascorbic acid). You want to limit daily intake of ascorbic acid to 500 mg/day or less, or it may falsely raise sensor glucose readings. This is more of a concern if you are on insulin or medication that can cause low blood glucose as you may miss a severe low  glucose event.    Support:   If you have any trouble with use or if the sensor falls off early, call the customer service number for Marlon located on the sensor's box. They can often help troubleshoot or replace sensor if needed.   Marlon Customer Service: [phone]    Online form to request a new sensor: [email or web address] [email or web address]-en/support/sensor-[email or web address]-[email or web address]      Escobar:   Libre3 (need to set up an account with email address)        Discount Programs:  MyFreestyle Program (https://[email or web address]/us-en/myfreestyle.html)     To save on sensors, if told cost is more than $75: call SafetyPay line at [phone] to see if you are eligible    Follow-up appointment: In person at [facility] on Tuesday, November 21st at 1:30pm.    Barb Patel RDN, LD, [facility]   [phone]    [facility] for the [facility] Area:  For Your Diabetes Education and Nutrition Appointments Call:  [phone]   For Diabetes Education or Nutrition Related Questions:   Phone: [phone]  Send [facility] Message   If you need a medication refill please contact your pharmacy. Please allow 3 business days for your refills to be completed.

## 2023-10-10 NOTE — LETTER
10/10/2023         RE: Antonina Diana  [address] [place] [address]        Dear Colleague,    Thank you for referring your patient, Antonina Diana, to the [facility]. Please see a copy of my visit note below.    Diabetes Self-Management Education & Support    Presents for: Individual review    Type of Service: In Person Visit    Assessment Type:   ASSESSMENT:  Antonina is not currently checking her blood glucose due to fear of needles, but is interested in the personal CGM. Since uncertain if her insurance will cover the sensor, will try for the Marlon 3 since it is the lower cost option and compatible with her phone. Provided overview on use and application of the Marlon 3:    CGM-specific education:   Freestyle Marlon sensor: insertion technique, sensor site location and rotation, insulin administration in relation to sensor placement, sensor wear, reasons to remove sensor (MRI, CT, diathermy), Vitamin C & Aspirin effects on sensor, Marlon [place]: frequency of scanning sensor, length of time data is visible, use of built in glucose meter, Precision X-tra test strips, and Use of trends and graphs for pattern management and problem solving       Also reviewed diet recall and patient not appear excessive in carbohydrate intake but reviewed carbohydrate sources and used food models to help demonstrate portion control. Answered food related questions and also discussed possible food changes if seeing elevated blood glucose after eating and/or activity since can help lower blood glucose. Reviewed target range of  mg/dL and ok to be higher than 180 mg/dL, as long as lower below this 2 hours post-prandial. Pt verbalized understanding of concepts discussed and recommendations provided.       Patient's most recent   Lab Results   Component Value Date    A1C 7.5 09/09/2022     is not meeting goal of <7.0    Diabetes knowledge and skills assessment:   Patient is knowledgeable in diabetes  "management concepts related to: Healthy Eating, Taking Medication, and Reducing Risks    Continue education with the following diabetes management concepts: Being Active, Monitoring, Problem Solving, and Healthy Coping    Based on learning assessment above, most appropriate setting for further diabetes education would be: Individual setting.      PLAN    -Start Marlon 3 so can check on blood glucose control at follow up.    Topics to cover at upcoming visits: Taking Medication and Problem Solving    Follow-up: 11/21/23    See Care Plan for co-developed, patient-state behavior change goals.  AVS provided for patient today.    Education Materials Provided:  No new materials provided today      SUBJECTIVE/OBJECTIVE:  Presents for: Individual review  Accompanied by: Self  Diabetes education in the past 24mo: No  Focus of Visit: CGM  Type of CGM visit: Personal CGM Start  Diabetes type: Type 2  Date of diagnosis: \"not remember but a long time ago\"  Disease course: Stable  How confident are you filling out medical forms by yourself:: Extremely  Diabetes management related comments/concerns: Say she is interested in a CGM to wear on the back of her arm.  Says she is scared of needles, which is why she never checked blood glucose before. Sister tries to help her with advice as she works in the hospital. Her sister is who recommended the personal CGM.  Transportation concerns: No  Difficulty affording diabetes medication?: No  Other concerns:: English as a second language, Glasses, Other (Says she is having trouble with memory)  Cultural Influences/Ethnic Background:  Not  or       Diabetes Symptoms & Complications:  Neuropathy: No  Polydipsia: Yes  Polyphagia: Sometimes  Visual change: No  Slow healing wounds: No  Complications assessed today?: Yes  Peripheral neuropathy: Yes    Patient Problem List and Family Medical History reviewed for relevant medical history, current medical status, and diabetes risk " "factors.    Vitals:  There were no vitals taken for this visit.  Estimated body mass index is 26.53 kg/m  as calculated from the following:    Height as of 9/9/22: 1.595 m (5' 2.8\").    Weight as of 9/9/22: 67.5 kg (148 lb 12.8 oz).   Last 3 BP:   BP Readings from Last 3 Encounters:   09/09/22 134/76   01/05/22 119/74   10/13/21 135/87       History   Smoking Status     Never   Smokeless Tobacco     Never       Labs:  Lab Results   Component Value Date    A1C 7.5 09/09/2022     Lab Results   Component Value Date     09/27/2022     11/18/2014     Lab Results   Component Value Date    LDL 58 06/03/2022     03/12/2013     HDL Cholesterol   Date Value Ref Range Status   03/12/2013 42 (L) 50 - 110 mg/dL Final     Direct Measure HDL   Date Value Ref Range Status   06/03/2022 67 >=50 mg/dL Final   ]  GFR Estimate   Date Value Ref Range Status   09/27/2022 >90 >60 mL/min/1.73m2 Final     Comment:     Effective December 21, 2021 eGFRcr in adults is calculated using the 2021 CKD-EPI creatinine equation which includes age and gender (Matheus et al., NEJM, DOI: 10.1056/SZSDwr7365067)   11/18/2014 >90  Non  GFR Calc   >60 mL/min/1.7m2 Final     GFR Estimate If Black   Date Value Ref Range Status   11/18/2014 >90   GFR Calc   >60 mL/min/1.7m2 Final     Lab Results   Component Value Date    CR 0.58 09/27/2022    CR 0.69 11/18/2014     No results found for: MICROALBUMIN    Healthy Eating:  Healthy Eating Assessed Today: Yes  Cultural/Catholic diet restrictions?: Yes ([id number])  Meal planning/habits: None  How many times a week on average do you eat food made away from home (restaurant/take-out)?: 5+  Meals include: Breakfast, Lunch, Dinner  Breakfast: Rice OR egg sandwich and coffee and 1 cup of milk and coffee  Lunch: 1 cup or less of rice and veggies /[place] food  Dinner: 1-2 slices of bread and eggs and 2 cups milk OR bowl of noodles - 3 days a week (1 cup or " less)  Beverages: Water, Coffee    Being Active:  Being Active Assessed Today: Yes  Exercise:: Yes  Days per week of moderate to strenuous exercise (like a brisk walk): 5 (Walks a lot at work- does  at her job.)  How intense was your typical exercise? : Light (like stretching or slow walking)    Monitoring:  Monitoring Assessed Today: Yes  Did patient bring glucose meter to appointment? : No  Times checking blood sugar at home (number): Never      Taking Medications:  Diabetes Medication(s)       Biguanides       metFORMIN (GLUCOPHAGE XR) 500 MG 24 hr tablet    Take 2 tablets (1,000 mg) by mouth 2 times daily (with meals)            Taking Medication Assessed Today: Yes  Current Treatments: Oral Medication (taken by mouth)  Problems taking diabetes medications regularly?: No  Diabetes medication side effects?: No    Problem Solving:  Problem Solving Assessed Today: No  Is the patient at risk for hypoglycemia?: No              Reducing Risks:  Reducing Risks Assessed Today: No  Diabetes Risks: Ethnicity, Family History (Mother had diabetes)    Healthy Coping:  Healthy Coping Assessed Today: Yes  Emotional response to diabetes: Ready to learn  Informal Support system:: Family  Stage of change: PREPARATION (Decided to change - considering how)  Patient Activation Measure Survey Score:       No data to display                  Care Plan and Education Provided:  Care Plan: Diabetes   Updates made by Barb Patel RD since 10/10/2023 12:00 AM        Problem: HbA1C Not In Goal         Goal: Establish Regular Follow-Ups with PCP         Task: Discuss with PCP the recommended timing for patient's next follow up visit(s)    Responsible User: Barb Patel RD        Task: Discuss schedule for PCP visits with patient    Responsible User: Barb Patel RD        Goal: Get HbA1C Level in Goal         Task: Educate patient on diabetes education self-management topics    Responsible User: Barb Patel RD         Task: Educate patient on benefits of regular glucose monitoring Completed 10/10/2023   Responsible User: Barb Patel RD        Task: Refer patient to appropriate extended care team member, as needed (Medication Therapy Management, Behavioral Health, Physical Therapy, etc.)    Responsible User: Barb Patel RD        Task: Discuss diabetes treatment plan with patient    Responsible User: Barb Patel RD        Problem: Diabetes Self-Management Education Needed to Optimize Self-Care Behaviors         Goal: Understand diabetes pathophysiology and disease progression         Task: Provide education on diabetes pathophysiology and disease progression specfic to patient's diabetes type    Responsible User: Barb Patel RD        Goal: Healthy Eating - follow a healthy eating pattern for diabetes         Task: Provide education on portion control and consistency in amount, composition and timing of food intake Completed 10/10/2023   Responsible User: Barb Patel RD        Task: Provide education on managing carbohydrate intake (carbohydrate counting, plate planning method, etc.) Completed 10/10/2023   Responsible User: Barb Patel RD        Task: Provide education on weight management    Responsible User: Barb Patel RD        Task: Provide education on heart healthy eating    Responsible User: Barb Patel RD        Task: Provide education on eating out    Responsible User: Barb Patel RD        Task: Develop individualized healthy eating plan with patient Completed 10/10/2023   Responsible User: Barb Patel RD        Goal: Being Active - get regular physical activity, working up to at least 150 minutes per week         Task: Provide education on relationship of activity to glucose and precautions to take if at risk for low glucose Completed 10/10/2023   Responsible User: Barb Patel RD        Task: Discuss barriers to physical activity with patient    Responsible  User: Barb Patel RD        Task: Develop physical activity plan with patient    Responsible User: Barb Patel RD        Task: Explore community resources including walking groups, assistance programs, and home videos    Responsible User: Barb Patel RD        Goal: Monitoring - monitor glucose and ketones as directed    This Visit's Progress: 0%   Note:    Start wearing Marlon 3 to better assess blood glucose control with current medications.       Task: Provide education on blood glucose monitoring (purpose, proper technique, frequency, glucose targets, interpreting results, when to use glucose control solution, sharps disposal)    Responsible User: Barb Patel RD        Task: Provide education on continuous glucose monitoring (sensor placement, use of katherine or /reader, understanding glucose trends, alerts and alarms, differences between sensor glucose and blood glucose) Completed 10/10/2023   Responsible User: Barb Patel RD        Task: Provide education on ketone monitoring (when to monitor, frequency, etc.)    Responsible User: Barb Patel RD        Goal: Taking Medication - patient is consistently taking medications as directed         Task: Provide education on action of prescribed medication, including when to take and possible side effects Completed 10/10/2023   Responsible User: Barb Patel RD        Task: Provide education on insulin and injectable diabetes medications, including administration, storage, site selection and rotation for injection sites    Responsible User: Barb Patel RD        Task: Discuss barriers to medication adherence with patient and provide management technique ideas as appropriate    Responsible User: Barb Patel RD        Task: Provide education on frequency and refill details of medications    Responsible User: Barb Patel RD        Goal: Problem Solving - know how to prevent and manage short-term diabetes complications          Task: Provide education on high blood glucose - causes, signs/symptoms, prevention and treatment    Responsible User: Barb Patel RD        Task: Provide education on low blood glucose - causes, signs/symptoms, prevention, treatment, carrying a carbohydrate source at all times, and medical identification    Responsible User: Barb Patel RD   Note:    Discussed symptoms hypoglycemia since low blood glucose may alarm on campos.       Task: Provide education on safe travel with diabetes    Responsible User: Barb Patel RD        Task: Provide education on how to care for diabetes on sick days    Responsible User: Barb Patel RD        Task: Provide education on when to call a health care provider    Responsible User: Barb Patel RD        Goal: Reducing Risks - know how to prevent and treat long-term diabetes complications         Task: Provide education on major complications of diabetes, prevention, early diagnostic measures and treatment of complications    Responsible User: Barb Patel RD        Task: Provide education on recommended care for dental, eye and foot health    Responsible User: Barb Patel RD        Task: Provide education on Hemoglobin A1c - goals and relationship to blood glucose levels    Responsible User: Barb Patel RD        Task: Provide education on recommendations for heart health - lipid levels and goals, blood pressure and goals, and aspirin therapy, if indicated    Responsible User: Barb Patel RD        Task: Provide education on tobacco cessation    Responsible User: Barb Patel RD        Goal: Healthy Coping - use available resources to cope with the challenges of managing diabetes         Task: Discuss recognizing feelings about having diabetes    Responsible User: Barb Patel RD        Task: Provide education on the benefits of making appropriate lifestyle changes    Responsible User: Barb Patel RD        Task: Provide  education on benefits of utilizing support systems    Responsible User: Barb Patel RD        Task: Discuss methods for coping with stress    Responsible User: Barb Patel RD        Task: Provide education on when to seek professional counseling    Responsible User: Barb Patel RD Kaydee Brown, RDN, LD, [facility]     Time Spent: 50 minutes  Encounter Type: Individual    Any diabetes medication dose changes were made via the CDE Protocol per the patient's referring provider. A copy of this encounter was shared with the provider.

## 2023-11-08 ENCOUNTER — IMMUNIZATION (OUTPATIENT)
Dept: NURSING | Facility: CLINIC | Age: 58
End: 2023-11-08
Payer: COMMERCIAL

## 2023-11-08 PROCEDURE — 91320 SARSCV2 VAC 30MCG TRS-SUC IM: CPT

## 2023-11-08 PROCEDURE — 90480 ADMN SARSCOV2 VAC 1/ONLY CMP: CPT

## 2023-11-08 PROCEDURE — 90686 IIV4 VACC NO PRSV 0.5 ML IM: CPT

## 2023-11-08 PROCEDURE — 90471 IMMUNIZATION ADMIN: CPT

## 2023-11-30 ENCOUNTER — TELEPHONE (OUTPATIENT)
Dept: FAMILY MEDICINE | Facility: CLINIC | Age: 58
End: 2023-11-30
Payer: COMMERCIAL

## 2023-11-30 NOTE — TELEPHONE ENCOUNTER
Patient Quality Outreach    Patient is due for the following:   Breast Cancer Screening - Mammogram  Cervical Cancer Screening - PAP Needed  Physical Preventive Adult Physical      Topic Date Due    Hepatitis B Vaccine (1 of 3 - 3-dose series) Never done    Pneumococcal Vaccine (1 - PCV) Never done    Zoster (Shingles) Vaccine (1 of 2) Never done    Diptheria Tetanus Pertussis (DTAP/TDAP/TD) Vaccine (3 - Td or Tdap) 03/12/2023       Next Steps:   Schedule a Adult Preventative    Type of outreach:    Sent [employer] message.    Next Steps:  Reach out within 90 days via [employer].    Max number of attempts reached: Yes. Will try again in 90 days if patient still on fail list.    Questions for provider review:    None           Stacy Moreland MA  Chart routed to Provider.

## 2023-11-30 NOTE — LETTER
November 30, 2023    Antonina Diana  [address] [place] [address]    Dear Antonina    At [facility] we care about your health and are committed to providing quality patient care.     Here is a list of Health Maintenance topics that are due now or due soon:  Health Maintenance Due   Topic Date Due    DIABETIC FOOT EXAM  Never done    HEPATITIS B IMMUNIZATION (1 of 3 - 3-dose series) Never done    Pneumococcal Vaccine: Pediatrics (0 to 5 Years) and At-Risk Patients (6 to 64 Years) (1 - PCV) Never done    MAMMO SCREENING  06/01/2012    PAP  06/01/2014    ZOSTER IMMUNIZATION (1 of 2) Never done    YEARLY PREVENTIVE VISIT  10/13/2022    COLORECTAL CANCER SCREENING  10/13/2022    A1C  12/09/2022    DTAP/TDAP/TD IMMUNIZATION (3 - Td or Tdap) 03/12/2023    EYE EXAM  03/27/2023    LIPID  06/03/2023    MICROALBUMIN  06/03/2023    BMP  09/27/2023        We are recommending that you:  Schedule a WELLNESS (Preventative/Physical) APPOINTMENT with your primary care provider. If you go elsewhere for your wellness appointments then please disregard this reminder    ,   Schedule a MAMMOGRAM which is due.    1 in 8 women will develop invasive breast cancer during her lifetime and it is the most common non-skin cancer in American women.  EARLY detection, new treatments, and a better understanding of the disease have increased survival rates - the 5 year survival rate in the 1960s was 63% and today it is close to 90%.    If you are under/uninsured, we recommend you contact the [facility]. They offer mammograms at no charge or on a sliding fee charge. You can schedule with them at [phone]. Please have them send us the results.      Please disregard this reminder if you have had this exam elsewhere within the last year.  It would be helpful for us to have a copy of your mammogram report in your file so that we can best coordinate your care - please contact us with when your test was done so we can  update your record.    ,   Schedule a PAP SMEAR EXAM which is due.  Please disregard this reminder if you have had this exam elsewhere within the last year.  It would be helpful for us to have a copy of your recent pap smear report in our file so that we can best coordinate your care.    If you are under/uninsured, we recommend you contact the [facility]. They offer pap smears at no charge or on a sliding fee charge. You can schedule with them at [phone]. Please have them send us the results.    , and   Schedule a Nurse-Only appointment to update your immunizations: Your records indicate that you are not up to date with your immunizations, please schedule a nurse-only appointment to get these updated or update them at your next office visit. If this is incorrect, please disregard.    To schedule an appointment or discuss this further, you may contact us by phone at the [facility] at [phone] or online through the patient portal/[email or web address] @ [email or web address]    Thank you for trusting [facility] and we appreciate the opportunity to serve you.  We look forward to supporting your healthcare needs in the future.    Your partners in health,      Quality Committee at [facility]

## 2024-02-08 ENCOUNTER — TRANSFERRED RECORDS (OUTPATIENT)
Dept: HEALTH INFORMATION MANAGEMENT | Facility: CLINIC | Age: 59
End: 2024-02-08
Payer: COMMERCIAL

## 2024-02-17 ENCOUNTER — HEALTH MAINTENANCE LETTER (OUTPATIENT)
Age: 59
End: 2024-02-17

## 2024-03-28 ENCOUNTER — VIRTUAL VISIT (OUTPATIENT)
Dept: FAMILY MEDICINE | Facility: CLINIC | Age: 59
End: 2024-03-28
Payer: COMMERCIAL

## 2024-03-28 ENCOUNTER — ORDERS ONLY (AUTO-RELEASED) (OUTPATIENT)
Dept: FAMILY MEDICINE | Facility: CLINIC | Age: 59
End: 2024-03-28

## 2024-03-28 DIAGNOSIS — Z12.31 VISIT FOR SCREENING MAMMOGRAM: ICD-10-CM

## 2024-03-28 DIAGNOSIS — E11.65 TYPE 2 DIABETES MELLITUS WITH HYPERGLYCEMIA, WITHOUT LONG-TERM CURRENT USE OF INSULIN (H): Primary | ICD-10-CM

## 2024-03-28 DIAGNOSIS — Z12.11 COLON CANCER SCREENING: ICD-10-CM

## 2024-03-28 DIAGNOSIS — E78.5 HYPERLIPIDEMIA LDL GOAL <100: ICD-10-CM

## 2024-03-28 PROCEDURE — 99214 OFFICE O/P EST MOD 30 MIN: CPT | Mod: 93 | Performed by: FAMILY MEDICINE

## 2024-03-28 RX ORDER — ATORVASTATIN CALCIUM 20 MG/1
20 TABLET, FILM COATED ORAL DAILY
Qty: 90 TABLET | Refills: 1 | Status: SHIPPED | OUTPATIENT
Start: 2024-03-28

## 2024-03-28 RX ORDER — METFORMIN HCL 500 MG
1000 TABLET, EXTENDED RELEASE 24 HR ORAL 2 TIMES DAILY WITH MEALS
Qty: 360 TABLET | Refills: 1 | Status: SHIPPED | OUTPATIENT
Start: 2024-03-28

## 2024-03-28 NOTE — PROGRESS NOTES
"    Instructions Relayed to Patient by Virtual Roomer:     If patient is not active on [employer]:  Relayed the following to patient: \"Would you like us to text or email you a link to join your appointment now or when your provider is ready to initiate the virtual visit?\"    Reminded patient to ensure they were logged on to virtual visit by arrival time listed. Documented in appointment notes if patient had flexibility to initiate visit sooner than arrival time. If pediatric virtual visit, ensured pediatric patient along with parent/guardian will be present for video visit.     Patient offered the website [email or web address] and/or phone number to [employer] Help line: [phone]    Antonina is a 58 year old who is being evaluated via a billable telephone visit.    What phone number would you like to be contacted at? [phone]   How would you like to obtain your AVS? Mail a copy  Originating Location (pt. Location): Home    Distant Location (provider location):  On-site        Subjective   Antonina is a 58 year old, presenting for the following health issues:  Recheck Medication and Diabetes  - Would like to know if she is a candidate for ozempic due to not taking medication daily and weight loss   - Endocrinologist Referral - Diabetic education and/or Nutrition Counseling  - Marlon sensor beeps like patricio, sister says she eats a lot of sugar. Candy, sweets, Jello      3/28/2024     6:57 AM   Additional Questions   Roomed by Ty TERRY     History of Present Illness       Diabetes:   She presents for follow up of diabetes.    She is not checking blood glucose.        She is concerned about other.   She is having numbness in feet and weight gain.                Medication Followup of metformin  Taking Medication as prescribed: NO-Sister mentions she is not taking daily  Side Effects:  None  Medication Helping Symptoms:  not applicable  Hyperlipidemia Follow-Up    Are you regularly taking any medication or " supplement to lower your cholesterol?   Yes- atorvastatin  Are you having muscle aches or other side effects that you think could be caused by your cholesterol lowering medication?  No        Review of Systems  Constitutional, HEENT, cardiovascular, pulmonary, GI, , musculoskeletal, neuro, skin, endocrine and psych systems are negative, except as otherwise noted.      Objective           Vitals:  No vitals were obtained today due to virtual visit.    Physical Exam   General: Alert and no distress //Respiratory: No audible wheeze, cough, or shortness of breath // Psychiatric:  Appropriate affect, tone, and pace of words      A/P:  (E11.65) Type 2 diabetes mellitus with hyperglycemia, without long-term current use of insulin (H)  (primary encounter diagnosis)  Comment:   Plan: Adult Endocrinology  Referral, Adult         Diabetes Education  Referral,         metFORMIN (GLUCOPHAGE XR) 500 MG 24 hr tablet,         Comprehensive metabolic panel (BMP + Alb, Alk         Phos, ALT, AST, Total. Bili, TP), Hemoglobin         A1c, Albumin Random Urine Quantitative with         Creat Ratio        Patient has been noncompliant to treatment. Patient currently not taking metformin. We did a virtual visit last year and she did not get the labs done. Discussed needing labs for monitoring. Likely uncontrolled. Restart metformin. Referred patient to diabetic educator to help with management. Recheck in 3 months.    (E78.5) Hyperlipidemia LDL goal <100  Comment:   Plan: atorvastatin (LIPITOR) 20 MG tablet,         Comprehensive metabolic panel (BMP + Alb, Alk         Phos, ALT, AST, Total. Bili, TP), Lipid panel         reflex to direct LDL Fasting        Patient stated she is taking atorvastatin. Recheck.    (Z12.11) Colon cancer screening  Comment:   Plan: NO([employer])            (Z12.31) Visit for screening mammogram  Comment:   Plan: MA SCREENING DIGITAL BILAT - Future  (s+30)                  Phone  call duration: 9 minutes  Signed Electronically by: Dez Raya MD, MD

## 2024-04-01 ENCOUNTER — LAB (OUTPATIENT)
Dept: LAB | Facility: CLINIC | Age: 59
End: 2024-04-01
Payer: COMMERCIAL

## 2024-04-01 DIAGNOSIS — E78.5 HYPERLIPIDEMIA LDL GOAL <100: ICD-10-CM

## 2024-04-01 DIAGNOSIS — E11.65 TYPE 2 DIABETES MELLITUS WITH HYPERGLYCEMIA, WITHOUT LONG-TERM CURRENT USE OF INSULIN (H): ICD-10-CM

## 2024-04-01 LAB
ALBUMIN SERPL BCG-MCNC: 4.5 G/DL (ref 3.5–5.2)
ALP SERPL-CCNC: 75 U/L (ref 40–150)
ALT SERPL W P-5'-P-CCNC: 14 U/L (ref 0–50)
ANION GAP SERPL CALCULATED.3IONS-SCNC: 12 MMOL/L (ref 7–15)
AST SERPL W P-5'-P-CCNC: 26 U/L (ref 0–45)
BILIRUB SERPL-MCNC: 0.5 MG/DL
BUN SERPL-MCNC: 8.7 MG/DL (ref 6–20)
CALCIUM SERPL-MCNC: 9.3 MG/DL (ref 8.6–10)
CHLORIDE SERPL-SCNC: 100 MMOL/L (ref 98–107)
CHOLEST SERPL-MCNC: 141 MG/DL
CREAT SERPL-MCNC: 0.66 MG/DL (ref 0.51–0.95)
CREAT UR-MCNC: 25.9 MG/DL
DEPRECATED HCO3 PLAS-SCNC: 25 MMOL/L (ref 22–29)
EGFRCR SERPLBLD CKD-EPI 2021: >90 ML/MIN/1.73M2
FASTING STATUS PATIENT QL REPORTED: NO
GLUCOSE SERPL-MCNC: 103 MG/DL (ref 70–99)
HBA1C MFR BLD: 6.6 % (ref 0–5.6)
HDLC SERPL-MCNC: 56 MG/DL
LDLC SERPL CALC-MCNC: 63 MG/DL
MICROALBUMIN UR-MCNC: <12 MG/L
MICROALBUMIN/CREAT UR: NORMAL MG/G{CREAT}
NONHDLC SERPL-MCNC: 85 MG/DL
POTASSIUM SERPL-SCNC: 4.3 MMOL/L (ref 3.4–5.3)
PROT SERPL-MCNC: 7.9 G/DL (ref 6.4–8.3)
SODIUM SERPL-SCNC: 137 MMOL/L (ref 135–145)
TRIGL SERPL-MCNC: 110 MG/DL

## 2024-04-01 PROCEDURE — 80061 LIPID PANEL: CPT

## 2024-04-01 PROCEDURE — 82043 UR ALBUMIN QUANTITATIVE: CPT

## 2024-04-01 PROCEDURE — 83036 HEMOGLOBIN GLYCOSYLATED A1C: CPT

## 2024-04-01 PROCEDURE — 80053 COMPREHEN METABOLIC PANEL: CPT

## 2024-04-01 PROCEDURE — 82570 ASSAY OF URINE CREATININE: CPT

## 2024-04-01 PROCEDURE — 36415 COLL VENOUS BLD VENIPUNCTURE: CPT

## 2024-05-07 ENCOUNTER — MYC MEDICAL ADVICE (OUTPATIENT)
Dept: EDUCATION SERVICES | Facility: CLINIC | Age: 59
End: 2024-05-07

## 2024-07-06 ENCOUNTER — HEALTH MAINTENANCE LETTER (OUTPATIENT)
Age: 59
End: 2024-07-06

## 2024-07-18 ENCOUNTER — TELEPHONE (OUTPATIENT)
Dept: FAMILY MEDICINE | Facility: CLINIC | Age: 59
End: 2024-07-18

## 2024-07-18 NOTE — TELEPHONE ENCOUNTER
Patient Quality Outreach    Patient is due for the following:   Diabetes -  A1C and Diabetic Follow-Up Visit  Colon Cancer Screening  Breast Cancer Screening - Mammogram  Cervical Cancer Screening - PAP Needed  Physical Preventive Adult Physical      Topic Date Due    Pneumococcal Vaccine (1 of 2 - PCV) Never done    Hepatitis B Vaccine (1 of 3 - 19+ 3-dose series) Never done    Zoster (Shingles) Vaccine (1 of 2) Never done    Diptheria Tetanus Pertussis (DTAP/TDAP/TD) Vaccine (3 - Td or Tdap) 03/12/2023       Next Steps:   Schedule a Adult Preventative    Type of outreach:    Sent F[employer] message.      Questions for provider review:    None           Cherelle Locke MA

## 2024-08-26 DIAGNOSIS — E11.65 TYPE 2 DIABETES MELLITUS WITH HYPERGLYCEMIA, WITHOUT LONG-TERM CURRENT USE OF INSULIN (H): ICD-10-CM

## 2024-08-26 RX ORDER — BLOOD-GLUCOSE SENSOR
EACH MISCELLANEOUS
OUTPATIENT
Start: 2024-08-26

## 2024-08-26 NOTE — TELEPHONE ENCOUNTER
Pt calling for the sensors. States that she is out and needs a refill. Pharmacy does not have refills available.    VALERIE TylerN, RN  [facility]

## 2024-08-27 NOTE — TELEPHONE ENCOUNTER
Pt states she left some sensor's in [place] when she was visiting there. Now pharmacy does not have any more refills of the sensor's left. Can you send more?  Carol Josue BSN, RN

## 2024-08-28 RX ORDER — BLOOD-GLUCOSE SENSOR
1 EACH MISCELLANEOUS
Qty: 2 EACH | Refills: 2 | Status: SHIPPED | OUTPATIENT
Start: 2024-08-28 | End: 2024-09-17

## 2024-09-16 ENCOUNTER — TELEPHONE (OUTPATIENT)
Dept: FAMILY MEDICINE | Facility: CLINIC | Age: 59
End: 2024-09-16
Payer: COMMERCIAL

## 2024-09-16 DIAGNOSIS — E11.65 TYPE 2 DIABETES MELLITUS WITH HYPERGLYCEMIA, WITHOUT LONG-TERM CURRENT USE OF INSULIN (H): ICD-10-CM

## 2024-09-16 NOTE — TELEPHONE ENCOUNTER
Patient calling to say the Free Style Marlon 3 Sensor [facility]. The regular ones are on back order.  Nicolasa Scott [facility]   Primary Care

## 2024-09-17 ENCOUNTER — IMMUNIZATION (OUTPATIENT)
Dept: FAMILY MEDICINE | Facility: CLINIC | Age: 59
End: 2024-09-17
Payer: COMMERCIAL

## 2024-09-17 DIAGNOSIS — Z23 NEED FOR PROPHYLACTIC VACCINATION AND INOCULATION AGAINST INFLUENZA: Primary | ICD-10-CM

## 2024-09-17 PROCEDURE — 99207 PR NO CHARGE NURSE ONLY: CPT

## 2024-09-17 PROCEDURE — 90471 IMMUNIZATION ADMIN: CPT

## 2024-09-17 PROCEDURE — 90673 RIV3 VACCINE NO PRESERV IM: CPT

## 2024-09-17 RX ORDER — BLOOD-GLUCOSE SENSOR
1 EACH MISCELLANEOUS
Qty: 2 EACH | Refills: 2 | Status: SHIPPED | OUTPATIENT
Start: 2024-09-17 | End: 2024-09-18

## 2024-09-18 RX ORDER — BLOOD-GLUCOSE SENSOR
EACH MISCELLANEOUS
Qty: 2 EACH | Refills: 5 | Status: SHIPPED | OUTPATIENT
Start: 2024-09-18

## 2024-09-18 NOTE — TELEPHONE ENCOUNTER
Patient calling again and the incorrect Rx was sent and needs the:  Free Style Marlon 3 Sensor Plus   Nicolasa Scott [facility]   Primary Care

## 2024-09-28 ENCOUNTER — OFFICE VISIT (OUTPATIENT)
Dept: URGENT CARE | Facility: URGENT CARE | Age: 59
End: 2024-09-28
Payer: COMMERCIAL

## 2024-09-28 ENCOUNTER — NURSE TRIAGE (OUTPATIENT)
Dept: NURSING | Facility: CLINIC | Age: 59
End: 2024-09-28
Payer: COMMERCIAL

## 2024-09-28 VITALS
WEIGHT: 155 LBS | BODY MASS INDEX: 27.64 KG/M2 | OXYGEN SATURATION: 94 % | HEART RATE: 70 BPM | TEMPERATURE: 96.8 F | SYSTOLIC BLOOD PRESSURE: 165 MMHG | RESPIRATION RATE: 16 BRPM | DIASTOLIC BLOOD PRESSURE: 90 MMHG

## 2024-09-28 DIAGNOSIS — I10 UNCONTROLLED HYPERTENSION: Primary | ICD-10-CM

## 2024-09-28 DIAGNOSIS — E11.65 TYPE 2 DIABETES MELLITUS WITH HYPERGLYCEMIA, WITHOUT LONG-TERM CURRENT USE OF INSULIN (H): ICD-10-CM

## 2024-09-28 PROCEDURE — 99213 OFFICE O/P EST LOW 20 MIN: CPT | Performed by: PREVENTIVE MEDICINE

## 2024-09-28 RX ORDER — AMLODIPINE BESYLATE 5 MG/1
5 TABLET ORAL DAILY
Qty: 90 TABLET | Refills: 0 | Status: SHIPPED | OUTPATIENT
Start: 2024-09-28

## 2024-09-28 ASSESSMENT — PAIN SCALES - GENERAL: PAINLEVEL: SEVERE PAIN (6)

## 2024-09-28 NOTE — TELEPHONE ENCOUNTER
Triage call   Patient callled she has a a headache and  her blood pressure is elevated 198/94 189/104.  She stated she has weakness on the left side.  She wanted to have a prescription sent in for high blood pressure she did not want to go to the ed and she for sure did not want to call 911. Explained that I could not recommend going to the Urgent care because of her symptoms but she was determined to go to the Urgent care because when she went to the ED she had to wait 8 hours last time and the ambulance was 700 dollars.    Per protocol call 911 Patient gave verbal understanding and she is not going to do that she is gong to go to the  Urgent care    Hermila Bella RN   [facility] Nurse Advisor  2:35 PM 9/28/2024    Reason for Disposition   [1] Weakness of the face, arm or leg on one side of the body AND [2] new-onset    Additional Information   Negative: Difficult to awaken or acting confused (e.g., disoriented, slurred speech)   Negative: SEVERE difficulty breathing (e.g., struggling for each breath, speaks in single words)    Protocols used: Blood Pressure - High-A-AH

## 2024-09-28 NOTE — PROGRESS NOTES
Assessment & Plan     Uncontrolled hypertension  -elevated readings at home too  -start Amlodipine 5 mg daily, if blood pressure is still elevated then plan to increase to 10 mg daily  -low salt diet  -follow up with primary care in 2 weeks  -no micro albumin on last labs hence defer ACE inhibitor and ARB for now  - amLODIPine (NORVASC) 5 MG tablet  Dispense: 90 tablet; Refill: 0    Type 2 diabetes mellitus with hyperglycemia, without long-term current use of insulin (H)  -on Metformin  -per PCP     Lab Results   Component Value Date    A1C 6.6 04/01/2024    A1C 7.5 09/09/2022    A1C 8.1 06/03/2022    A1C >15.0 10/13/2021          20 minutes spent by me on the date of the encounter doing chart review, history and exam, documentation and further activities per the note        Return if symptoms worsen or fail to improve.    Yvrose Low MD MPH    [facility] is a 59 year old female who presents to clinic today for the following health issues:  Chief Complaint   Patient presents with    Neck Pain     Neck pain beginning today; checked blood pressure at home and it was 189/104. Patient states blood pressure is not decreasing today. Patient denies chest pain. Patient states she has a stiff neck.     Hypertension         3/28/2024     7:06 AM   Additional Questions   Roomed by Ty TERRY   Accompanied by Sister     HPI    Home readings are from 140-190 systolic.  No chest pain  No vision loss  No focal weakness  No pedal edema  No hematuria  No slurred speech   No focal weakness  Numbness in digits pre existing  No syncope  No emesis  No exercise  No tobacco   No alcohol  Not sure about snoring since sleeps alone   Walks at work   Has not been able to follow up with PCP due to lack of appointments   Taking diabetes medication as prescribed  Some neck stiffness, no heaviness  No shortness of breath  No dizziness  No palpitations     Had a nurse triage  "today:    \"Triage call   Patient callled she has a a headache and  her blood pressure is elevated 198/94 189/104.  She stated she has weakness on the left side.  She wanted to have a prescription sent in for high blood pressure she did not want to go to the ed and she for sure did not want to call 911. Explained that I could not recommend going to the Urgent care because of her symptoms but she was determined to go to the Urgent care because when she went to the ED she had to wait 8 hours last time and the ambulance was 700 dollars.     Per protocol call 911 Patient gave verbal understanding and she is not going to do that she is gong to go to the  Urgent care\"      Review of Systems  Constitutional, HEENT, cardiovascular, pulmonary, gi and gu systems are negative, except as otherwise noted.      Objective    BP (!) 165/90   Pulse 70   Temp 96.8  F (36  C) (Tympanic)   Resp 16   Wt 70.3 kg (155 lb)   SpO2 94%   BMI 27.64 kg/m    Physical Exam   GENERAL APPEARANCE: healthy, alert and no distress  EYES: Eyes grossly normal to inspection and conjunctivae and sclerae normal  RESP: lungs clear to auscultation - no rales, rhonchi or wheezes  CV: regular rates and rhythm, normal S1 S2  ABDOMEN: soft, non-tender and no rebound or guarding   MS: extremities normal- no gross deformities noted and peripheral pulses normal  SKIN: no suspicious lesions or rashes  NEURO: Normal strength and tone, mentation intact and speech normal  PSYCH: mentation appears normal      No results found for this or any previous visit (from the past 24 hour(s)).        "

## 2024-11-15 DIAGNOSIS — E11.65 TYPE 2 DIABETES MELLITUS WITH HYPERGLYCEMIA, WITHOUT LONG-TERM CURRENT USE OF INSULIN (H): ICD-10-CM

## 2024-11-15 RX ORDER — ACYCLOVIR 800 MG/1
1 TABLET ORAL
Qty: 2 EACH | Refills: 11 | Status: SHIPPED | OUTPATIENT
Start: 2024-11-15

## 2024-11-15 NOTE — TELEPHONE ENCOUNTER
Pt states the pharmacy said they do not have the marlon 3 plus sensors available and they are requesting you send the previous sensor, Marlon 3 to them, they have it in stock.  Carol Josue BSN, RN

## 2024-11-16 ENCOUNTER — TELEPHONE (OUTPATIENT)
Dept: FAMILY MEDICINE | Facility: CLINIC | Age: 59
End: 2024-11-16
Payer: COMMERCIAL

## 2024-11-16 DIAGNOSIS — E11.65 TYPE 2 DIABETES MELLITUS WITH HYPERGLYCEMIA, WITHOUT LONG-TERM CURRENT USE OF INSULIN (H): ICD-10-CM

## 2024-11-16 NOTE — TELEPHONE ENCOUNTER
Drug not covered by patient plan. The preferred alternative is DEXCOMGMISSENSOR, DEXCOMGMISSENSOR,. Please call/fax the pharmacy to change medication along with strength, directions, quantity, and refills.              Continuous Glucose Sensor (FREESTYLE KATHY 3 PLUS SENSOR) MISC

## 2024-11-19 RX ORDER — ACYCLOVIR 400 MG/1
1 TABLET ORAL
Qty: 9 EACH | Refills: 5 | Status: SHIPPED | OUTPATIENT
Start: 2024-11-19

## 2024-11-19 RX ORDER — ACYCLOVIR 400 MG/1
1 TABLET ORAL ONCE
Qty: 1 EACH | Refills: 0 | Status: SHIPPED | OUTPATIENT
Start: 2024-11-19 | End: 2024-11-19

## 2024-11-23 ENCOUNTER — HEALTH MAINTENANCE LETTER (OUTPATIENT)
Age: 59
End: 2024-11-23

## 2024-12-07 DIAGNOSIS — E78.5 HYPERLIPIDEMIA LDL GOAL <100: ICD-10-CM

## 2024-12-07 DIAGNOSIS — E11.65 TYPE 2 DIABETES MELLITUS WITH HYPERGLYCEMIA, WITHOUT LONG-TERM CURRENT USE OF INSULIN (H): ICD-10-CM

## 2024-12-09 RX ORDER — METFORMIN HYDROCHLORIDE 500 MG/1
1000 TABLET, EXTENDED RELEASE ORAL 2 TIMES DAILY WITH MEALS
Qty: 360 TABLET | Refills: 1 | Status: SHIPPED | OUTPATIENT
Start: 2024-12-09

## 2024-12-09 RX ORDER — ATORVASTATIN CALCIUM 20 MG/1
TABLET, FILM COATED ORAL
Qty: 90 TABLET | Refills: 1 | Status: SHIPPED | OUTPATIENT
Start: 2024-12-09

## 2024-12-21 ENCOUNTER — TELEPHONE (OUTPATIENT)
Dept: NURSING | Facility: CLINIC | Age: 59
End: 2024-12-21
Payer: COMMERCIAL

## 2024-12-21 NOTE — TELEPHONE ENCOUNTER
Nurse Triage SBAR    Is this a 2nd Level Triage? NO    Situation: Pt would like to change her Dexcom 7 changed to a Freestyle Marlon due to a manufacturing issue. Pharmacy does not have the Dexcom 7.     Assessment: Pt is asking for a message to be sent to her clinic on her behalf to make this request     Recommendation: Routing this message to the clinic     Routed to provider    Eliza Knox RN   Triage Nurse Advisor on 12/21/2024 at 9:54 AM

## 2024-12-22 ENCOUNTER — TELEPHONE (OUTPATIENT)
Dept: FAMILY MEDICINE | Facility: CLINIC | Age: 59
End: 2024-12-22
Payer: COMMERCIAL

## 2024-12-22 DIAGNOSIS — E11.65 TYPE 2 DIABETES MELLITUS WITH HYPERGLYCEMIA, WITHOUT LONG-TERM CURRENT USE OF INSULIN (H): Primary | ICD-10-CM

## 2024-12-24 DIAGNOSIS — I10 UNCONTROLLED HYPERTENSION: ICD-10-CM

## 2024-12-24 RX ORDER — HYDROCHLOROTHIAZIDE 12.5 MG/1
CAPSULE ORAL
Qty: 6 EACH | Refills: 11 | Status: SHIPPED | OUTPATIENT
Start: 2024-12-24

## 2024-12-24 RX ORDER — AMLODIPINE BESYLATE 5 MG/1
TABLET ORAL
Qty: 90 TABLET | Refills: 0 | Status: SHIPPED | OUTPATIENT
Start: 2024-12-24

## 2024-12-24 RX ORDER — KETOROLAC TROMETHAMINE 30 MG/ML
1 INJECTION, SOLUTION INTRAMUSCULAR; INTRAVENOUS ONCE
Qty: 1 EACH | Refills: 0 | Status: SHIPPED | OUTPATIENT
Start: 2024-12-24 | End: 2024-12-24

## 2025-01-28 ENCOUNTER — TELEPHONE (OUTPATIENT)
Dept: FAMILY MEDICINE | Facility: CLINIC | Age: 60
End: 2025-01-28
Payer: COMMERCIAL

## 2025-01-28 NOTE — TELEPHONE ENCOUNTER
Patient Quality Outreach    Patient is due for the following:   Diabetes -  Foot Exam  Colon Cancer Screening  Breast Cancer Screening - Mammogram  Cervical Cancer Screening - PAP Needed  Physical Preventive Adult Physical      Topic Date Due    Pneumococcal Vaccine (1 of 2 - PCV) Never done    Hepatitis B Vaccine (1 of 3 - 19+ 3-dose series) Never done    Zoster (Shingles) Vaccine (1 of 2) Never done    Diptheria Tetanus Pertussis (DTAP/TDAP/TD) Vaccine (3 - Td or Tdap) 03/12/2023    COVID-19 Vaccine (5 - 2024-25 season) 09/01/2024       Action(s) Taken:   Schedule a Adult Preventative    Type of outreach:    Sent S[employer] message.    Questions for provider review:    None           Cherelle Locke MA

## 2025-03-08 ENCOUNTER — HEALTH MAINTENANCE LETTER (OUTPATIENT)
Age: 60
End: 2025-03-08

## 2025-03-25 DIAGNOSIS — I10 UNCONTROLLED HYPERTENSION: ICD-10-CM

## 2025-03-25 RX ORDER — AMLODIPINE BESYLATE 5 MG/1
TABLET ORAL
Qty: 90 TABLET | Refills: 0 | Status: SHIPPED | OUTPATIENT
Start: 2025-03-25

## 2025-06-22 ENCOUNTER — HEALTH MAINTENANCE LETTER (OUTPATIENT)
Age: 60
End: 2025-06-22